# Patient Record
Sex: MALE | Race: WHITE | NOT HISPANIC OR LATINO | URBAN - METROPOLITAN AREA
[De-identification: names, ages, dates, MRNs, and addresses within clinical notes are randomized per-mention and may not be internally consistent; named-entity substitution may affect disease eponyms.]

---

## 2020-06-01 ENCOUNTER — LAB OUTSIDE AN ENCOUNTER (OUTPATIENT)
Dept: URBAN - METROPOLITAN AREA CLINIC 98 | Facility: CLINIC | Age: 55
End: 2020-06-01

## 2020-06-01 ENCOUNTER — OFFICE VISIT (OUTPATIENT)
Dept: URBAN - METROPOLITAN AREA CLINIC 98 | Facility: CLINIC | Age: 55
End: 2020-06-01

## 2020-06-01 ENCOUNTER — OFFICE VISIT (OUTPATIENT)
Dept: URBAN - METROPOLITAN AREA CLINIC 98 | Facility: CLINIC | Age: 55
End: 2020-06-01
Payer: COMMERCIAL

## 2020-06-01 DIAGNOSIS — R19.7 DIARRHEA: ICD-10-CM

## 2020-06-01 DIAGNOSIS — K92.1 RECTAL BLEEDING: ICD-10-CM

## 2020-06-01 DIAGNOSIS — K50.80 CROHN'S DISEASE OF BOTH SMALL AND LARGE INTESTINE: ICD-10-CM

## 2020-06-01 PROCEDURE — 99215 OFFICE O/P EST HI 40 MIN: CPT | Performed by: INTERNAL MEDICINE

## 2020-06-01 PROCEDURE — 99214 OFFICE O/P EST MOD 30 MIN: CPT | Performed by: INTERNAL MEDICINE

## 2020-06-01 RX ORDER — DICYCLOMINE HYDROCHLORIDE 10 MG/1
TAKE 1 CAPSULE BY ORAL ROUTE 3 TIMES A DAY AS NEEDED CAPSULE ORAL
Qty: 90 | Refills: 5 | Status: ACTIVE | COMMUNITY
Start: 2020-01-06

## 2020-06-01 RX ORDER — INFLIXIMAB 100 MG/10ML
INFUSE 400MG NO LESS THAN 2 HOUR(S) BY INTRAVENOUS ROUTE GIVE TODAY, REPEAT IN 4 WEEKS, THEN GIVE EVERY 8 WEEKS INJECTION, POWDER, LYOPHILIZED, FOR SOLUTION INTRAVENOUS
Qty: 1 | Refills: 0 | Status: ACTIVE | COMMUNITY
Start: 2019-11-25

## 2020-06-01 RX ORDER — AMLODIPINE BESYLATE 10 MG/1
TAKE 1 TABLET (10 MG) BY ORAL ROUTE ONCE DAILY TABLET ORAL 1
Qty: 0 | Refills: 0 | Status: ACTIVE | COMMUNITY
Start: 1900-01-01

## 2020-06-01 RX ORDER — PREDNISONE 10 MG/1
TAKE 1 TABLET (10 MG) BY ORAL ROUTE 4 TIMES PER DAY TABLET ORAL
Qty: 120 | Refills: 0 | Status: ACTIVE | COMMUNITY
Start: 1900-01-01

## 2020-06-01 RX ORDER — CHOLECALCIFEROL (VITAMIN D3) 50 MCG
1 TABLET TABLET ORAL ONCE A DAY
Status: ACTIVE | COMMUNITY

## 2020-06-01 NOTE — PHYSICAL EXAM MUSCULOSKELETAL:
normal gait and station , no tenderness or deformities present , normal gait and station , no tenderness or deformities present

## 2020-06-01 NOTE — PHYSICAL EXAM GASTROINTESTINAL
Abdomen , soft, nontender, nondistended , no guarding or rigidity , no masses palpable , normal bowel sounds , Liver and Spleen , no hepatomegaly present , no hepatosplenomegaly , liver nontender , spleen not palpable , Abdomen , soft, nontender, nondistended , no guarding or rigidity , no masses palpable , normal bowel sounds , Liver and Spleen , no hepatomegaly present , no hepatosplenomegaly , liver nontender , spleen not palpable

## 2020-06-01 NOTE — PHYSICAL EXAM SKIN:
no rashes , no suspicious lesions , no areas of discoloration , no jaundice present , good turgor , no masses , no tenderness on palpation , no rashes , no suspicious lesions , no areas of discoloration , no jaundice present , good turgor , no masses , no tenderness on palpation

## 2020-06-01 NOTE — PHYSICAL EXAM HENT:
Head, normocephalic, atraumatic, Face, Face within normal limits, Ears, External ears within normal limits, Nose/Nasopharynx, External nose  normal appearance, nares patent, no nasal discharge, Mouth and Throat, Oral cavity appearance normal, Breath odor normal, Lips, Appearance normal , Head, normocephalic, atraumatic, Face, Face within normal limits, Ears, External ears within normal limits, Nose/Nasopharynx, External nose  normal appearance, nares patent, no nasal discharge, Mouth and Throat, Oral cavity appearance normal, Breath odor normal, Lips, Appearance normal

## 2020-06-01 NOTE — PHYSICAL EXAM NECK/THYROID:
normal appearance , without tenderness upon palpation , no deformities , trachea midline , Thyroid normal size , no thyroid nodules , no masses , no JVD , thyroid nontender , normal appearance , without tenderness upon palpation , no deformities , trachea midline , Thyroid normal size , no thyroid nodules , no masses , no JVD , thyroid nontender

## 2020-06-01 NOTE — HPI-OTHER HISTORIES
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   Had initial visit with me on 3/4/20 and had started Remicade November 2019, and on was on 5 mg/kg q 8 weeks and I suspected he was underdosed.  Has not needed blood transfusion but is seeing Dr. Brower and getting Injectafer.  Has received 3 10 mg/kg doses at 4 week intervals.  Has 8-10 stools a day.    Has abdominal pain that comes and goes/half the movement lead to pain.  Has used prednisone in the past but is off it now.  Also got partial relief with Uceris foam.  Has not lost weight, though had lost 40 lb over 2 years.  In past year lost 20 lb.

## 2020-06-01 NOTE — HPI-TODAY'S VISIT:
1 week course of prednisone initally.  8 week course of prednisone from mid Nov to early Jan.  Prior course of Humira did not help much.  Currently having 8-10 stools, half are bloody.

## 2020-06-01 NOTE — PHYSICAL EXAM CHEST:
no lesions,  no deformities,  no traumatic injuries,  no significant scars are present,  chest wall non-tender,  no masses present,  tactile fremitus is normal, breathing is unlabored without accessory muscle use,normal breath sounds , no lesions,  no deformities,  no traumatic injuries,  no significant scars are present,  chest wall non-tender,  no masses present,  tactile fremitus is normal, breathing is unlabored without accessory muscle use,normal breath sounds

## 2020-06-05 ENCOUNTER — OFFICE VISIT (OUTPATIENT)
Dept: URBAN - METROPOLITAN AREA CLINIC 70 | Facility: CLINIC | Age: 55
End: 2020-06-05

## 2020-06-07 LAB
A/G RATIO: 1.3
ALBUMIN: 4.8
ALKALINE PHOSPHATASE: 62
ALT (SGPT): 8
ANTI-INFLIXIMAB ANTIBODY: 272
AST (SGOT): 11
BASO (ABSOLUTE): 0.1
BASOS: 1
BILIRUBIN, TOTAL: 0.3
BUN/CREATININE RATIO: 7
BUN: 8
C-REACTIVE PROTEIN, QUANT: 5
CALCIUM: 10.1
CARBON DIOXIDE, TOTAL: 21
CHLORIDE: 101
CREATININE: 1.12
EGFR IF AFRICN AM: 84
EGFR IF NONAFRICN AM: 73
EOS (ABSOLUTE): 0.3
EOS: 3
GLOBULIN, TOTAL: 3.6
GLUCOSE: 90
HEMATOCRIT: 45.5
HEMATOLOGY COMMENTS:: (no result)
HEMOGLOBIN: 14.9
IMMATURE CELLS: (no result)
IMMATURE GRANS (ABS): 0
IMMATURE GRANULOCYTES: 0
INFLIXIMAB DRUG LEVEL: 25
LYMPHS (ABSOLUTE): 1.6
LYMPHS: 21
MCH: 26.7
MCHC: 32.7
MCV: 82
MONOCYTES(ABSOLUTE): 0.7
MONOCYTES: 9
NEUTROPHILS (ABSOLUTE): 4.7
NEUTROPHILS: 66
NRBC: (no result)
PLATELETS: 379
POTASSIUM: 3.9
PROTEIN, TOTAL: 8.4
RBC: 5.58
RDW: 14.2
SEDIMENTATION RATE-WESTERGREN: 84
SODIUM: 140
WBC: 7.3

## 2020-06-17 ENCOUNTER — TELEPHONE ENCOUNTER (OUTPATIENT)
Dept: URBAN - METROPOLITAN AREA CLINIC 92 | Facility: CLINIC | Age: 55
End: 2020-06-17

## 2020-07-01 LAB
A/G RATIO: 1.3
ALBUMIN: 4.5
ALKALINE PHOSPHATASE: 54
ALT (SGPT): 10
ANTI-INFLIXIMAB ANTIBODY: 234
AST (SGOT): 10
BASO (ABSOLUTE): 0
BASOS: 1
BILIRUBIN, TOTAL: 0.2
BUN/CREATININE RATIO: 7
BUN: 8
C-REACTIVE PROTEIN, QUANT: 8
CALCIUM: 9.7
CARBON DIOXIDE, TOTAL: 23
CHLORIDE: 101
CREATININE: 1.16
EGFR IF AFRICN AM: 80
EGFR IF NONAFRICN AM: 70
EOS (ABSOLUTE): 0.3
EOS: 5
GLOBULIN, TOTAL: 3.4
GLUCOSE: 170
HEMATOCRIT: 41
HEMATOLOGY COMMENTS:: (no result)
HEMOGLOBIN: 13.9
IMMATURE CELLS: (no result)
IMMATURE GRANS (ABS): 0
IMMATURE GRANULOCYTES: 0
INFLIXIMAB DRUG LEVEL: 0.8
INTERPRETATION:: (no result)
LYMPHS (ABSOLUTE): 1.2
LYMPHS: 23
Lab: (no result)
MCH: 27.4
MCHC: 33.9
MCV: 81
MONOCYTES(ABSOLUTE): 0.4
MONOCYTES: 8
NEUTROPHILS (ABSOLUTE): 3.2
NEUTROPHILS: 63
NRBC: (no result)
PLATELETS: 326
POTASSIUM: 3.4
PROTEIN, TOTAL: 7.9
RBC: 5.07
RDW: 13.9
SEDIMENTATION RATE-WESTERGREN: 30
SODIUM: 142
TPMT ACTIVITY: 25.5
WBC: 5.1

## 2020-07-07 ENCOUNTER — TELEPHONE ENCOUNTER (OUTPATIENT)
Dept: URBAN - METROPOLITAN AREA CLINIC 92 | Facility: CLINIC | Age: 55
End: 2020-07-07

## 2020-07-07 RX ORDER — AMLODIPINE BESYLATE 10 MG/1
TAKE 1 TABLET (10 MG) BY ORAL ROUTE ONCE DAILY TABLET ORAL 1
Qty: 0 | Refills: 0 | Status: ACTIVE | COMMUNITY
Start: 1900-01-01

## 2020-07-07 RX ORDER — AZATHIOPRINE 50 MG/1
ONE TABLET ORAL
Qty: 30 | Refills: 3 | OUTPATIENT
Start: 2020-07-07 | End: 2020-11-03

## 2020-07-07 RX ORDER — CHOLECALCIFEROL (VITAMIN D3) 50 MCG
1 TABLET TABLET ORAL ONCE A DAY
Status: ACTIVE | COMMUNITY

## 2020-07-07 RX ORDER — INFLIXIMAB 100 MG/10ML
INFUSE 400MG NO LESS THAN 2 HOUR(S) BY INTRAVENOUS ROUTE GIVE TODAY, REPEAT IN 4 WEEKS, THEN GIVE EVERY 8 WEEKS INJECTION, POWDER, LYOPHILIZED, FOR SOLUTION INTRAVENOUS
Qty: 1 | Refills: 0 | Status: ACTIVE | COMMUNITY
Start: 2019-11-25

## 2020-07-07 RX ORDER — DICYCLOMINE HYDROCHLORIDE 10 MG/1
TAKE 1 CAPSULE BY ORAL ROUTE 3 TIMES A DAY AS NEEDED CAPSULE ORAL
Qty: 90 | Refills: 5 | Status: ACTIVE | COMMUNITY
Start: 2020-01-06

## 2020-07-07 RX ORDER — PREDNISONE 10 MG/1
TAKE 1 TABLET (10 MG) BY ORAL ROUTE 4 TIMES PER DAY TABLET ORAL
Qty: 120 | Refills: 0 | Status: ACTIVE | COMMUNITY
Start: 1900-01-01

## 2020-07-24 ENCOUNTER — TELEPHONE ENCOUNTER (OUTPATIENT)
Dept: URBAN - METROPOLITAN AREA CLINIC 92 | Facility: CLINIC | Age: 55
End: 2020-07-24

## 2020-07-24 ENCOUNTER — OFFICE VISIT (OUTPATIENT)
Dept: URBAN - METROPOLITAN AREA TELEHEALTH 2 | Facility: TELEHEALTH | Age: 55
End: 2020-07-24
Payer: COMMERCIAL

## 2020-07-24 DIAGNOSIS — K76.0 FATTY LIVER: ICD-10-CM

## 2020-07-24 DIAGNOSIS — R19.7 DIARRHEA: ICD-10-CM

## 2020-07-24 DIAGNOSIS — Z79.899 IMMUNOSUPPRESSION DUE TO DRUG THERAPY: ICD-10-CM

## 2020-07-24 DIAGNOSIS — K51.80 OTHER ULCERATIVE COLITIS WITHOUT COMPLICATIONS: ICD-10-CM

## 2020-07-24 DIAGNOSIS — K92.1 BLOOD IN STOOL: ICD-10-CM

## 2020-07-24 DIAGNOSIS — R10.84 ABDOMINAL PAIN, GENERALIZED: ICD-10-CM

## 2020-07-24 PROCEDURE — 82397 CHEMILUMINESCENT ASSAY: CPT | Performed by: INTERNAL MEDICINE

## 2020-07-24 PROCEDURE — 99214 OFFICE O/P EST MOD 30 MIN: CPT | Performed by: INTERNAL MEDICINE

## 2020-07-24 RX ORDER — PREDNISONE 10 MG/1
TAKE 1 TABLET (10 MG) BY ORAL ROUTE 4 TIMES PER DAY TABLET ORAL
Qty: 120 | Refills: 0 | COMMUNITY
Start: 1900-01-01

## 2020-07-24 RX ORDER — AZATHIOPRINE 50 MG/1
ONE TABLET ORAL
Qty: 30 | Refills: 3 | COMMUNITY
Start: 2020-07-07 | End: 2020-11-03

## 2020-07-24 RX ORDER — AMLODIPINE BESYLATE 10 MG/1
TAKE 1 TABLET (10 MG) BY ORAL ROUTE ONCE DAILY TABLET ORAL 1
Qty: 0 | Refills: 0 | COMMUNITY
Start: 1900-01-01

## 2020-07-24 RX ORDER — CHOLECALCIFEROL (VITAMIN D3) 50 MCG
1 TABLET TABLET ORAL ONCE A DAY
COMMUNITY

## 2020-07-24 RX ORDER — INFLIXIMAB 100 MG/10ML
INFUSE 400MG NO LESS THAN 2 HOUR(S) BY INTRAVENOUS ROUTE GIVE TODAY, REPEAT IN 4 WEEKS, THEN GIVE EVERY 8 WEEKS INJECTION, POWDER, LYOPHILIZED, FOR SOLUTION INTRAVENOUS
Qty: 1 | Refills: 0 | COMMUNITY
Start: 2019-11-25

## 2020-07-24 RX ORDER — DICYCLOMINE HYDROCHLORIDE 10 MG/1
TAKE 1 CAPSULE BY ORAL ROUTE 3 TIMES A DAY AS NEEDED CAPSULE ORAL
Qty: 90 | Refills: 5 | COMMUNITY
Start: 2020-01-06

## 2020-07-24 NOTE — HPI-OTHER HISTORIES
56 yo  male with Crohn's disease. Much the same. Negligible  Stool a little more solid.  8 stools a day May be less blood q 4 rather than q 3 days. Gets cramping. Pain is the same---same as cramping.  Remicade 10 mg/kg q 4 weeks. ---- level is low--.8 --- way too  Azathioprine 50 mg a day.   - abd --- Quirky ---- 50 % better and 5-10% worse  Will check labs in August and go slow dose increase until then  Went from 240 to 200 with dieting and CD has caused weight to drop from 200 to 175. Was as low as 164.   no h/o diabetes.  Will increase aza to 2 per day and get labs in 1 week. 7/30  Get pre-IFX dose Bebo and aza level in August.

## 2020-08-17 ENCOUNTER — LAB OUTSIDE AN ENCOUNTER (OUTPATIENT)
Dept: URBAN - METROPOLITAN AREA CLINIC 86 | Facility: CLINIC | Age: 55
End: 2020-08-17

## 2020-08-27 LAB
6-MMPN: 1558
6-TGN: 236
A/G RATIO: 1.3
ALBUMIN: 4.7
ALKALINE PHOSPHATASE: 53
ALT (SGPT): 8
AMYLASE: 95
ANTI-INFLIXIMAB ANTIBODY: 347
AST (SGOT): 12
BASO (ABSOLUTE): 0.1
BASOS: 1
BILIRUBIN, TOTAL: 0.4
BUN/CREATININE RATIO: 7
BUN: 8
C-REACTIVE PROTEIN, QUANT: 5
CALCIUM: 9.6
CARBON DIOXIDE, TOTAL: 23
CHLORIDE: 102
CREATININE: 1.13
EGFR IF AFRICN AM: 83
EGFR IF NONAFRICN AM: 72
EOS (ABSOLUTE): 0.2
EOS: 3
GLOBULIN, TOTAL: 3.5
GLUCOSE: 98
HEMATOCRIT: 45.8
HEMATOLOGY COMMENTS:: (no result)
HEMOGLOBIN: 14.3
IMMATURE CELLS: (no result)
IMMATURE GRANS (ABS): 0
IMMATURE GRANULOCYTES: 0
INFLIXIMAB DRUG LEVEL: 5.2
LIPASE: 38
LYMPHS (ABSOLUTE): 1.2
LYMPHS: 22
MCH: 25.3
MCHC: 31.2
MCV: 81
MONOCYTES(ABSOLUTE): 0.5
MONOCYTES: 9
NEUTROPHILS (ABSOLUTE): 3.5
NEUTROPHILS: 65
NRBC: (no result)
PLATELETS: 346
POTASSIUM: 3.6
PROTEIN, TOTAL: 8.2
RBC: 5.65
RDW: 14
SEDIMENTATION RATE-WESTERGREN: 24
SODIUM: 142
WBC: 5.4

## 2020-09-02 ENCOUNTER — TELEPHONE ENCOUNTER (OUTPATIENT)
Dept: URBAN - METROPOLITAN AREA CLINIC 92 | Facility: CLINIC | Age: 55
End: 2020-09-02

## 2020-09-02 RX ORDER — INFLIXIMAB 100 MG/10ML
INFUSE 400MG NO LESS THAN 2 HOUR(S) BY INTRAVENOUS ROUTE GIVE TODAY, REPEAT IN 4 WEEKS, THEN GIVE EVERY 8 WEEKS INJECTION, POWDER, LYOPHILIZED, FOR SOLUTION INTRAVENOUS
Qty: 1 | Refills: 0
Start: 2019-11-25

## 2020-09-04 ENCOUNTER — TELEPHONE ENCOUNTER (OUTPATIENT)
Dept: URBAN - METROPOLITAN AREA CLINIC 70 | Facility: CLINIC | Age: 55
End: 2020-09-04

## 2020-09-04 ENCOUNTER — LAB OUTSIDE AN ENCOUNTER (OUTPATIENT)
Dept: URBAN - METROPOLITAN AREA CLINIC 70 | Facility: CLINIC | Age: 55
End: 2020-09-04

## 2020-10-19 ENCOUNTER — TELEPHONE ENCOUNTER (OUTPATIENT)
Dept: URBAN - METROPOLITAN AREA CLINIC 98 | Facility: CLINIC | Age: 55
End: 2020-10-19

## 2020-10-19 RX ORDER — AZATHIOPRINE 50 MG/1
TAKE 50 MG TABLET ORAL ONCE DAILY
Qty: 90 TABLETS | Refills: 3 | OUTPATIENT
Start: 2020-10-19 | End: 2021-10-14

## 2020-12-02 ENCOUNTER — TELEPHONE ENCOUNTER (OUTPATIENT)
Dept: URBAN - METROPOLITAN AREA CLINIC 70 | Facility: CLINIC | Age: 55
End: 2020-12-02

## 2020-12-02 ENCOUNTER — LAB OUTSIDE AN ENCOUNTER (OUTPATIENT)
Dept: URBAN - METROPOLITAN AREA CLINIC 70 | Facility: CLINIC | Age: 55
End: 2020-12-02

## 2020-12-08 ENCOUNTER — TELEPHONE ENCOUNTER (OUTPATIENT)
Dept: URBAN - METROPOLITAN AREA CLINIC 98 | Facility: CLINIC | Age: 55
End: 2020-12-08

## 2020-12-08 RX ORDER — AZATHIOPRINE 50 MG/1
TAKE ONE TABLET TABLET ORAL BID
Qty: 180 TABLETS | Refills: 3 | OUTPATIENT
Start: 2020-12-08 | End: 2021-12-03

## 2021-03-10 ENCOUNTER — TELEPHONE ENCOUNTER (OUTPATIENT)
Dept: URBAN - METROPOLITAN AREA CLINIC 70 | Facility: CLINIC | Age: 56
End: 2021-03-10

## 2021-03-12 ENCOUNTER — TELEPHONE ENCOUNTER (OUTPATIENT)
Dept: URBAN - METROPOLITAN AREA CLINIC 70 | Facility: CLINIC | Age: 56
End: 2021-03-12

## 2021-05-21 ENCOUNTER — TELEPHONE ENCOUNTER (OUTPATIENT)
Dept: URBAN - METROPOLITAN AREA CLINIC 70 | Facility: CLINIC | Age: 56
End: 2021-05-21

## 2021-06-08 ENCOUNTER — TELEPHONE ENCOUNTER (OUTPATIENT)
Dept: URBAN - METROPOLITAN AREA CLINIC 92 | Facility: CLINIC | Age: 56
End: 2021-06-08

## 2021-06-08 ENCOUNTER — TELEPHONE ENCOUNTER (OUTPATIENT)
Dept: URBAN - METROPOLITAN AREA CLINIC 98 | Facility: CLINIC | Age: 56
End: 2021-06-08

## 2021-06-08 RX ORDER — INFLIXIMAB 100 MG/10ML
INFUSE 400MG NO LESS THAN 2 HOUR(S) BY INTRAVENOUS ROUTE GIVE TODAY, REPEAT IN 4 WEEKS, THEN GIVE EVERY 8 WEEKS INJECTION, POWDER, LYOPHILIZED, FOR SOLUTION INTRAVENOUS
Qty: 1 | Refills: 0
Start: 2019-11-25

## 2021-06-08 RX ORDER — INFLIXIMAB-DYYB 100 MG/10ML
INFUSE 10MG/KG INJECTION, POWDER, LYOPHILIZED, FOR SOLUTION INTRAVENOUS
Qty: 1 VIAL | Refills: 11 | OUTPATIENT
Start: 2021-06-08 | End: 2022-06-03

## 2021-06-09 ENCOUNTER — TELEPHONE ENCOUNTER (OUTPATIENT)
Dept: URBAN - METROPOLITAN AREA CLINIC 92 | Facility: CLINIC | Age: 56
End: 2021-06-09

## 2021-06-09 ENCOUNTER — LAB OUTSIDE AN ENCOUNTER (OUTPATIENT)
Dept: URBAN - METROPOLITAN AREA CLINIC 98 | Facility: CLINIC | Age: 56
End: 2021-06-09

## 2021-06-09 ENCOUNTER — OFFICE VISIT (OUTPATIENT)
Dept: URBAN - METROPOLITAN AREA CLINIC 98 | Facility: CLINIC | Age: 56
End: 2021-06-09
Payer: COMMERCIAL

## 2021-06-09 DIAGNOSIS — R19.7 DIARRHEA: ICD-10-CM

## 2021-06-09 DIAGNOSIS — Z79.899 IMMUNOSUPPRESSION DUE TO DRUG THERAPY: ICD-10-CM

## 2021-06-09 DIAGNOSIS — K92.1 BLOOD IN STOOL: ICD-10-CM

## 2021-06-09 DIAGNOSIS — K50.80 CROHN'S COLITIS: ICD-10-CM

## 2021-06-09 PROCEDURE — 99215 OFFICE O/P EST HI 40 MIN: CPT | Performed by: INTERNAL MEDICINE

## 2021-06-09 RX ORDER — AZATHIOPRINE 50 MG/1
TAKE ONE TABLET TABLET ORAL BID
Qty: 180 TABLETS | Refills: 3 | COMMUNITY
Start: 2020-12-08 | End: 2021-12-03

## 2021-06-09 RX ORDER — INFLIXIMAB 100 MG/10ML
INFUSE 400MG NO LESS THAN 2 HOUR(S) BY INTRAVENOUS ROUTE GIVE TODAY, REPEAT IN 4 WEEKS, THEN GIVE EVERY 8 WEEKS INJECTION, POWDER, LYOPHILIZED, FOR SOLUTION INTRAVENOUS
Qty: 1 | Refills: 0 | COMMUNITY
Start: 2019-11-25

## 2021-06-09 RX ORDER — PREDNISONE 10 MG/1
TAKE 1 TABLET (10 MG) BY ORAL ROUTE 4 TIMES PER DAY TABLET ORAL
Qty: 120 | Refills: 0 | COMMUNITY
Start: 1900-01-01

## 2021-06-09 RX ORDER — AZATHIOPRINE 50 MG/1
TAKE 50 MG TABLET ORAL ONCE DAILY
Qty: 90 TABLETS | Refills: 3 | COMMUNITY
Start: 2020-10-19 | End: 2021-10-14

## 2021-06-09 RX ORDER — AMLODIPINE BESYLATE 10 MG/1
TAKE 1 TABLET (10 MG) BY ORAL ROUTE ONCE DAILY TABLET ORAL 1
Qty: 0 | Refills: 0 | COMMUNITY
Start: 1900-01-01

## 2021-06-09 RX ORDER — CHOLECALCIFEROL (VITAMIN D3) 50 MCG
1 TABLET TABLET ORAL ONCE A DAY
COMMUNITY

## 2021-06-09 RX ORDER — INFLIXIMAB-DYYB 100 MG/10ML
INFUSE 10MG/KG INJECTION, POWDER, LYOPHILIZED, FOR SOLUTION INTRAVENOUS
Qty: 1 VIAL | Refills: 6 | OUTPATIENT
Start: 2021-06-09 | End: 2022-01-04

## 2021-06-09 RX ORDER — DICYCLOMINE HYDROCHLORIDE 10 MG/1
TAKE 1 CAPSULE BY ORAL ROUTE 3 TIMES A DAY AS NEEDED CAPSULE ORAL
Qty: 90 | Refills: 5 | COMMUNITY
Start: 2020-01-06

## 2021-06-09 RX ORDER — INFLIXIMAB 100 MG/10ML
10 MG/KG AS DIRECTED INJECTION, POWDER, LYOPHILIZED, FOR SOLUTION INTRAVENOUS
Qty: 1 BAG | Refills: 6 | OUTPATIENT
Start: 2021-06-09 | End: 2021-12-21

## 2021-06-09 NOTE — HPI-TODAY'S VISIT:
57 yo male took early nursing home from [employer] and comes in for 1 year f/u.  Has improved on Remicade 10 mg/kg q 4 weeks.  Stooling has improved.  He is 2 weeks overdue for Remicade.  He was getting infusions at [facility] in [place].  Insurance denied and reapplied but past due.  Stooling has improved from 8 to 4 per day, more formed and blood couple times a month.  Moderate bleeding on [place].  Taking 100 mg azathioprine per day.  Last prednisone 1 year ago  Needs to change infusion site as they could not an approval - [facility] in [place].  IFX level 5.2   /ROGERS - 347 Active GI bleed 2019  Needed iv iron from hematologist in April 2021--- Dr. Brian Choi --------     ===================== 7/24/20  56 yo  male with Crohn's disease. Much the same. Negligible  Stool a little more solid.  8 stools a day May be less blood q 4 rather than q 3 days. Gets cramping. Pain is the same---same as cramping.  Remicade 10 mg/kg q 4 weeks. ---- level is low--.8 --- way too  Azathioprine 50 mg a day.   - abd --- Quirky ---- 50 % better and 5-10% worse  Will check labs in August and go slow dose increase until then  Went from 240 to 200 with dieting and CD has caused weight to drop from 200 to 175. Was as low as 164.   no h/o diabetes.  Will increase aza to 2 per day and get labs in 1 week. 7/30  Get pre-IFX dose Bebo and aza level in August.

## 2021-06-16 LAB
A/G RATIO: 1.2
ALBUMIN: 4.7
ALKALINE PHOSPHATASE: 59
ALT (SGPT): 11
ANTI-INFLIXIMAB ANTIBODY: 50
AST (SGOT): 11
BASO (ABSOLUTE): 0
BASOS: 1
BILIRUBIN, TOTAL: 0.4
BUN/CREATININE RATIO: 10
BUN: 11
C-REACTIVE PROTEIN, QUANT: 9
CALCIUM: 9.5
CARBON DIOXIDE, TOTAL: 25
CHLORIDE: 101
CREATININE: 1.06
EGFR IF AFRICN AM: 89
EGFR IF NONAFRICN AM: 77
EOS (ABSOLUTE): 0.1
EOS: 2
FERRITIN, SERUM: 107
FOLATE (FOLIC ACID), SERUM: 11.6
GLOBULIN, TOTAL: 3.8
GLUCOSE: 109
HBSAG SCREEN: NEGATIVE
HEMATOCRIT: 50
HEMATOLOGY COMMENTS:: (no result)
HEMOGLOBIN: 16
HEP B CORE AB, IGM: NEGATIVE
HEP B CORE AB, TOT: NEGATIVE
HEP B SURFACE AB, QUAL: NON REACTIVE
HEP BE AB: NEGATIVE
HEP BE AG: NEGATIVE
IMMATURE CELLS: (no result)
IMMATURE GRANS (ABS): 0
IMMATURE GRANULOCYTES: 0
INFLIXIMAB DRUG LEVEL: 4.9
IRON BIND.CAP.(TIBC): 367
IRON SATURATION: 14
IRON: 51
LYMPHS (ABSOLUTE): 1.1
LYMPHS: 19
MCH: 25.8
MCHC: 32
MCV: 81
MONOCYTES(ABSOLUTE): 0.5
MONOCYTES: 9
NEUTROPHILS (ABSOLUTE): 3.9
NEUTROPHILS: 69
NRBC: (no result)
PLATELETS: 334
POTASSIUM: 3.5
PROTEIN, TOTAL: 8.5
QUANTIFERON CRITERIA: (no result)
QUANTIFERON INCUBATION: (no result)
QUANTIFERON MITOGEN VALUE: >10
QUANTIFERON NIL VALUE: 0
QUANTIFERON TB1 AG VALUE: 0.04
QUANTIFERON TB2 AG VALUE: 0.02
QUANTIFERON-TB GOLD PLUS: NEGATIVE
RBC: 6.2
RDW: 14.8
SEDIMENTATION RATE-WESTERGREN: 54
SODIUM: 138
UIBC: 316
VITAMIN B12: 300
VITAMIN C: 0.4
VITAMIN D, 25-HYDROXY: 36.8
WBC: 5.6

## 2021-06-17 LAB
C DIFFICILE TOXIN GENE NAA: NEGATIVE
C DIFFICILE TOXINS A+B, EIA: NEGATIVE
CAMPYLOBACTER CULTURE: (no result)
E COLI SHIGA TOXIN EIA: (no result)
LACTOFERRIN, FECAL, QUANT.: 5.58
OVA + PARASITE EXAM: (no result)
REQUEST PROBLEM: (no result)
SALMONELLA/SHIGELLA SCREEN: (no result)

## 2021-06-23 ENCOUNTER — LAB OUTSIDE AN ENCOUNTER (OUTPATIENT)
Dept: URBAN - METROPOLITAN AREA CLINIC 98 | Facility: CLINIC | Age: 56
End: 2021-06-23

## 2021-07-01 ENCOUNTER — CLAIMS CREATED FROM THE CLAIM WINDOW (OUTPATIENT)
Dept: URBAN - METROPOLITAN AREA CLINIC 4 | Facility: CLINIC | Age: 56
End: 2021-07-01
Payer: COMMERCIAL

## 2021-07-01 ENCOUNTER — OFFICE VISIT (OUTPATIENT)
Dept: URBAN - METROPOLITAN AREA SURGERY CENTER 18 | Facility: SURGERY CENTER | Age: 56
End: 2021-07-01
Payer: COMMERCIAL

## 2021-07-01 ENCOUNTER — TELEPHONE ENCOUNTER (OUTPATIENT)
Dept: URBAN - METROPOLITAN AREA CLINIC 98 | Facility: CLINIC | Age: 56
End: 2021-07-01

## 2021-07-01 DIAGNOSIS — R12 BURNING REFLUX: ICD-10-CM

## 2021-07-01 DIAGNOSIS — K31.89 GASTRIC FOVEOLAR HYPERPLASIA: ICD-10-CM

## 2021-07-01 DIAGNOSIS — K31.89 ACQUIRED DEFORMITY OF DUODENUM: ICD-10-CM

## 2021-07-01 DIAGNOSIS — K51.80 CHRONIC PANCOLONIC ULCERATIVE COLITIS: ICD-10-CM

## 2021-07-01 DIAGNOSIS — K50.10 CC (CROHN'S COLITIS): ICD-10-CM

## 2021-07-01 DIAGNOSIS — K63.89 POLYP OF ILEUM: ICD-10-CM

## 2021-07-01 DIAGNOSIS — K22.8 COLUMNAR-LINED ESOPHAGUS: ICD-10-CM

## 2021-07-01 DIAGNOSIS — K22.10 ULCER OF ESOPHAGUS WITHOUT BLEEDING: ICD-10-CM

## 2021-07-01 DIAGNOSIS — K63.89 BACTERIAL OVERGROWTH SYNDROME: ICD-10-CM

## 2021-07-01 DIAGNOSIS — K51.80 OTHER ULCERATIVE COLITIS WITHOUT COMPLICATIONS: ICD-10-CM

## 2021-07-01 DIAGNOSIS — K20.80 ESOPHAGITIS DISSECANS SUPERFICIALIS: ICD-10-CM

## 2021-07-01 PROCEDURE — 88342 IMHCHEM/IMCYTCHM 1ST ANTB: CPT | Performed by: PATHOLOGY

## 2021-07-01 PROCEDURE — 88305 TISSUE EXAM BY PATHOLOGIST: CPT | Performed by: PATHOLOGY

## 2021-07-01 PROCEDURE — 43239 EGD BIOPSY SINGLE/MULTIPLE: CPT | Performed by: INTERNAL MEDICINE

## 2021-07-01 PROCEDURE — 88341 IMHCHEM/IMCYTCHM EA ADD ANTB: CPT | Performed by: PATHOLOGY

## 2021-07-01 PROCEDURE — 88312 SPECIAL STAINS GROUP 1: CPT | Performed by: PATHOLOGY

## 2021-07-01 PROCEDURE — 45380 COLONOSCOPY AND BIOPSY: CPT | Performed by: INTERNAL MEDICINE

## 2021-07-01 PROCEDURE — G8907 PT DOC NO EVENTS ON DISCHARG: HCPCS | Performed by: INTERNAL MEDICINE

## 2021-07-01 RX ORDER — AZATHIOPRINE 50 MG/1
TAKE ONE TABLET TABLET ORAL BID
Qty: 180 TABLETS | Refills: 3 | COMMUNITY
Start: 2020-12-08 | End: 2021-12-03

## 2021-07-01 RX ORDER — INFLIXIMAB 100 MG/10ML
INFUSE 400MG NO LESS THAN 2 HOUR(S) BY INTRAVENOUS ROUTE GIVE TODAY, REPEAT IN 4 WEEKS, THEN GIVE EVERY 8 WEEKS INJECTION, POWDER, LYOPHILIZED, FOR SOLUTION INTRAVENOUS
Qty: 1 | Refills: 0 | COMMUNITY
Start: 2019-11-25

## 2021-07-01 RX ORDER — CHOLECALCIFEROL (VITAMIN D3) 50 MCG
1 TABLET TABLET ORAL ONCE A DAY
COMMUNITY

## 2021-07-01 RX ORDER — INFLIXIMAB 100 MG/10ML
10 MG/KG AS DIRECTED INJECTION, POWDER, LYOPHILIZED, FOR SOLUTION INTRAVENOUS
Qty: 1 BAG | Refills: 6 | Status: ACTIVE | COMMUNITY
Start: 2021-06-09 | End: 2021-12-21

## 2021-07-01 RX ORDER — AMLODIPINE BESYLATE 10 MG/1
TAKE 1 TABLET (10 MG) BY ORAL ROUTE ONCE DAILY TABLET ORAL 1
Qty: 0 | Refills: 0 | COMMUNITY
Start: 1900-01-01

## 2021-07-01 RX ORDER — OMEPRAZOLE 40 MG/1
TAKE ONE CAPSULE CAPSULE, DELAYED RELEASE ORAL TWICE DAILY
Qty: 180 CAPSULES | Refills: 3 | OUTPATIENT
Start: 2021-07-01

## 2021-07-01 RX ORDER — AZATHIOPRINE 50 MG/1
TAKE 50 MG TABLET ORAL ONCE DAILY
Qty: 90 TABLETS | Refills: 3 | COMMUNITY
Start: 2020-10-19 | End: 2021-10-14

## 2021-07-01 RX ORDER — DICYCLOMINE HYDROCHLORIDE 10 MG/1
TAKE 1 CAPSULE BY ORAL ROUTE 3 TIMES A DAY AS NEEDED CAPSULE ORAL
Qty: 90 | Refills: 5 | COMMUNITY
Start: 2020-01-06

## 2021-07-01 RX ORDER — PREDNISONE 10 MG/1
TAKE 1 TABLET (10 MG) BY ORAL ROUTE 4 TIMES PER DAY TABLET ORAL
Qty: 120 | Refills: 0 | COMMUNITY
Start: 1900-01-01

## 2021-07-01 RX ORDER — INFLIXIMAB-DYYB 100 MG/10ML
INFUSE 10MG/KG INJECTION, POWDER, LYOPHILIZED, FOR SOLUTION INTRAVENOUS
Qty: 1 VIAL | Refills: 6 | Status: ACTIVE | COMMUNITY
Start: 2021-06-09 | End: 2022-01-04

## 2021-07-02 ENCOUNTER — OFFICE VISIT (OUTPATIENT)
Dept: URBAN - METROPOLITAN AREA CLINIC 117 | Facility: CLINIC | Age: 56
End: 2021-07-02
Payer: COMMERCIAL

## 2021-07-02 DIAGNOSIS — K50.80 CROHN'S COLITIS: ICD-10-CM

## 2021-07-02 PROCEDURE — 96413 CHEMO IV INFUSION 1 HR: CPT | Performed by: INTERNAL MEDICINE

## 2021-07-02 PROCEDURE — 96415 CHEMO IV INFUSION ADDL HR: CPT | Performed by: INTERNAL MEDICINE

## 2021-07-02 RX ORDER — CHOLECALCIFEROL (VITAMIN D3) 50 MCG
1 TABLET TABLET ORAL ONCE A DAY
COMMUNITY

## 2021-07-02 RX ORDER — INFLIXIMAB-DYYB 100 MG/10ML
INFUSE 10MG/KG INJECTION, POWDER, LYOPHILIZED, FOR SOLUTION INTRAVENOUS
Qty: 1 VIAL | Refills: 6 | Status: ACTIVE | COMMUNITY
Start: 2021-06-09 | End: 2022-01-04

## 2021-07-02 RX ORDER — AZATHIOPRINE 50 MG/1
TAKE 50 MG TABLET ORAL ONCE DAILY
Qty: 90 TABLETS | Refills: 3 | COMMUNITY
Start: 2020-10-19 | End: 2021-10-14

## 2021-07-02 RX ORDER — DICYCLOMINE HYDROCHLORIDE 10 MG/1
TAKE 1 CAPSULE BY ORAL ROUTE 3 TIMES A DAY AS NEEDED CAPSULE ORAL
Qty: 90 | Refills: 5 | COMMUNITY
Start: 2020-01-06

## 2021-07-02 RX ORDER — AZATHIOPRINE 50 MG/1
TAKE ONE TABLET TABLET ORAL BID
Qty: 180 TABLETS | Refills: 3 | COMMUNITY
Start: 2020-12-08 | End: 2021-12-03

## 2021-07-02 RX ORDER — INFLIXIMAB 100 MG/10ML
INFUSE 400MG NO LESS THAN 2 HOUR(S) BY INTRAVENOUS ROUTE GIVE TODAY, REPEAT IN 4 WEEKS, THEN GIVE EVERY 8 WEEKS INJECTION, POWDER, LYOPHILIZED, FOR SOLUTION INTRAVENOUS
Qty: 1 | Refills: 0 | COMMUNITY
Start: 2019-11-25

## 2021-07-02 RX ORDER — PREDNISONE 10 MG/1
TAKE 1 TABLET (10 MG) BY ORAL ROUTE 4 TIMES PER DAY TABLET ORAL
Qty: 120 | Refills: 0 | COMMUNITY
Start: 1900-01-01

## 2021-07-02 RX ORDER — OMEPRAZOLE 40 MG/1
TAKE ONE CAPSULE CAPSULE, DELAYED RELEASE ORAL TWICE DAILY
Qty: 180 CAPSULES | Refills: 3 | Status: ACTIVE | COMMUNITY
Start: 2021-07-01

## 2021-07-02 RX ORDER — INFLIXIMAB 100 MG/10ML
10 MG/KG AS DIRECTED INJECTION, POWDER, LYOPHILIZED, FOR SOLUTION INTRAVENOUS
Qty: 1 BAG | Refills: 6 | Status: ACTIVE | COMMUNITY
Start: 2021-06-09 | End: 2021-12-21

## 2021-07-02 RX ORDER — AMLODIPINE BESYLATE 10 MG/1
TAKE 1 TABLET (10 MG) BY ORAL ROUTE ONCE DAILY TABLET ORAL 1
Qty: 0 | Refills: 0 | COMMUNITY
Start: 1900-01-01

## 2021-07-06 ENCOUNTER — OFFICE VISIT (OUTPATIENT)
Dept: URBAN - METROPOLITAN AREA CLINIC 98 | Facility: CLINIC | Age: 56
End: 2021-07-06

## 2021-07-06 RX ORDER — INFLIXIMAB 100 MG/10ML
INFUSE 400MG NO LESS THAN 2 HOUR(S) BY INTRAVENOUS ROUTE GIVE TODAY, REPEAT IN 4 WEEKS, THEN GIVE EVERY 8 WEEKS INJECTION, POWDER, LYOPHILIZED, FOR SOLUTION INTRAVENOUS
Qty: 1 | Refills: 0 | Status: ACTIVE | COMMUNITY
Start: 2019-11-25

## 2021-07-06 RX ORDER — PREDNISONE 10 MG/1
TAKE 1 TABLET (10 MG) BY ORAL ROUTE 4 TIMES PER DAY TABLET ORAL
Qty: 120 | Refills: 0 | Status: ACTIVE | COMMUNITY
Start: 1900-01-01

## 2021-07-06 RX ORDER — CHOLECALCIFEROL (VITAMIN D3) 50 MCG
1 TABLET TABLET ORAL ONCE A DAY
Status: ACTIVE | COMMUNITY

## 2021-07-06 RX ORDER — AMLODIPINE BESYLATE 10 MG/1
TAKE 1 TABLET (10 MG) BY ORAL ROUTE ONCE DAILY TABLET ORAL 1
Qty: 0 | Refills: 0 | Status: ACTIVE | COMMUNITY
Start: 1900-01-01

## 2021-07-06 RX ORDER — AZATHIOPRINE 50 MG/1
TAKE ONE TABLET TABLET ORAL BID
Qty: 180 TABLETS | Refills: 3 | Status: DISCONTINUED | COMMUNITY
Start: 2020-12-08 | End: 2021-12-03

## 2021-07-06 RX ORDER — DICYCLOMINE HYDROCHLORIDE 10 MG/1
TAKE 1 CAPSULE BY ORAL ROUTE 3 TIMES A DAY AS NEEDED CAPSULE ORAL
Qty: 90 | Refills: 5 | Status: ACTIVE | COMMUNITY
Start: 2020-01-06

## 2021-07-06 RX ORDER — AZATHIOPRINE 50 MG/1
TAKE 50 MG TABLET ORAL ONCE DAILY
Qty: 90 TABLETS | Refills: 3 | Status: ACTIVE | COMMUNITY
Start: 2020-10-19 | End: 2021-10-14

## 2021-07-20 ENCOUNTER — TELEPHONE ENCOUNTER (OUTPATIENT)
Dept: URBAN - METROPOLITAN AREA CLINIC 98 | Facility: CLINIC | Age: 56
End: 2021-07-20

## 2021-07-20 ENCOUNTER — OFFICE VISIT (OUTPATIENT)
Dept: URBAN - METROPOLITAN AREA CLINIC 98 | Facility: CLINIC | Age: 56
End: 2021-07-20
Payer: COMMERCIAL

## 2021-07-20 ENCOUNTER — WEB ENCOUNTER (OUTPATIENT)
Dept: URBAN - METROPOLITAN AREA CLINIC 98 | Facility: CLINIC | Age: 56
End: 2021-07-20

## 2021-07-20 DIAGNOSIS — R19.7 DIARRHEA: ICD-10-CM

## 2021-07-20 DIAGNOSIS — K21.9 GASTROESOPHAGEAL REFLUX DISEASE, UNSPECIFIED WHETHER ESOPHAGITIS PRESENT: ICD-10-CM

## 2021-07-20 DIAGNOSIS — K50.10 CROHN'S COLITIS: ICD-10-CM

## 2021-07-20 DIAGNOSIS — K92.1 BLOOD IN STOOL: ICD-10-CM

## 2021-07-20 PROCEDURE — 99215 OFFICE O/P EST HI 40 MIN: CPT | Performed by: INTERNAL MEDICINE

## 2021-07-20 RX ORDER — OMEPRAZOLE 40 MG/1
TAKE ONE CAPSULE CAPSULE, DELAYED RELEASE ORAL TWICE DAILY
Qty: 180 CAPSULES | Refills: 3 | Status: ACTIVE | COMMUNITY
Start: 2021-07-01

## 2021-07-20 RX ORDER — AZATHIOPRINE 50 MG/1
TAKE 50 MG TABLET ORAL ONCE DAILY
Qty: 90 TABLETS | Refills: 3 | Status: ACTIVE | COMMUNITY
Start: 2020-10-19 | End: 2021-10-14

## 2021-07-20 RX ORDER — INFLIXIMAB 100 MG/10ML
10 MG/KG AS DIRECTED INJECTION, POWDER, LYOPHILIZED, FOR SOLUTION INTRAVENOUS
Qty: 1 BAG | Refills: 6 | Status: ACTIVE | COMMUNITY
Start: 2021-06-09 | End: 2021-12-21

## 2021-07-20 RX ORDER — CHOLECALCIFEROL (VITAMIN D3) 50 MCG
1 TABLET TABLET ORAL ONCE A DAY
Status: ACTIVE | COMMUNITY

## 2021-07-20 RX ORDER — DICYCLOMINE HYDROCHLORIDE 10 MG/1
TAKE 1 CAPSULE BY ORAL ROUTE 3 TIMES A DAY AS NEEDED CAPSULE ORAL
Qty: 90 | Refills: 5 | Status: ACTIVE | COMMUNITY
Start: 2020-01-06

## 2021-07-20 RX ORDER — PREDNISONE 10 MG/1
TAKE 1 TABLET (10 MG) BY ORAL ROUTE 4 TIMES PER DAY TABLET ORAL
Qty: 120 | Refills: 0 | Status: ACTIVE | COMMUNITY
Start: 1900-01-01

## 2021-07-20 RX ORDER — OMEPRAZOLE 40 MG/1
1 CAPSULE 30 MINUTES BEFORE MORNING MEAL CAPSULE, DELAYED RELEASE ORAL ONCE A DAY
Qty: 90 CAPSULES | Refills: 1 | OUTPATIENT
Start: 2021-07-20

## 2021-07-20 RX ORDER — INFLIXIMAB 100 MG/10ML
INFUSE 400MG NO LESS THAN 2 HOUR(S) BY INTRAVENOUS ROUTE GIVE TODAY, REPEAT IN 4 WEEKS, THEN GIVE EVERY 8 WEEKS INJECTION, POWDER, LYOPHILIZED, FOR SOLUTION INTRAVENOUS
Qty: 1 | Refills: 0 | Status: ACTIVE | COMMUNITY
Start: 2019-11-25

## 2021-07-20 RX ORDER — AMLODIPINE BESYLATE 10 MG/1
TAKE 1 TABLET (10 MG) BY ORAL ROUTE ONCE DAILY TABLET ORAL 1
Qty: 0 | Refills: 0 | Status: ACTIVE | COMMUNITY
Start: 1900-01-01

## 2021-07-20 RX ORDER — INFLIXIMAB-DYYB 100 MG/10ML
INFUSE 10MG/KG INJECTION, POWDER, LYOPHILIZED, FOR SOLUTION INTRAVENOUS
Qty: 1 VIAL | Refills: 6 | Status: ACTIVE | COMMUNITY
Start: 2021-06-09 | End: 2022-01-04

## 2021-07-20 NOTE — HPI-TODAY'S VISIT:
55 yo male comes in for f/u.  Last seen 6/9 and do to stable but active CD, was switched to Inflectra.  6/11/21 trough IFX = 5.8 by [facility] with no ATI. Had first infusion on 7/2 and tolerated it fine. Will continue inflectra and watch course.  Had EGD on 7/1 and this showed severe Grade 4 esophagitis. Never had EGD in past. Does not report much past heartburn. Will rx with longterm PPI omeprazole 40 mg bid and then once better, q day.  Will check TDM before next 10 mg/kg q 4 week infusion late July and if all well f/u at 3 months and when better f/u in 6 month intervals.  Repeat EGD in 6-12 mos and colon in 12 months.  If worsening, he will call and will work  Also,notify us if any adverse reaction to inflectra.

## 2021-08-05 ENCOUNTER — OFFICE VISIT (OUTPATIENT)
Dept: URBAN - METROPOLITAN AREA CLINIC 117 | Facility: CLINIC | Age: 56
End: 2021-08-05
Payer: COMMERCIAL

## 2021-08-05 DIAGNOSIS — K50.80 CROHN'S COLITIS: ICD-10-CM

## 2021-08-05 PROCEDURE — 96413 CHEMO IV INFUSION 1 HR: CPT | Performed by: INTERNAL MEDICINE

## 2021-08-05 PROCEDURE — 96415 CHEMO IV INFUSION ADDL HR: CPT | Performed by: INTERNAL MEDICINE

## 2021-08-05 RX ORDER — AMLODIPINE BESYLATE 10 MG/1
TAKE 1 TABLET (10 MG) BY ORAL ROUTE ONCE DAILY TABLET ORAL 1
Qty: 0 | Refills: 0 | Status: ACTIVE | COMMUNITY
Start: 1900-01-01

## 2021-08-05 RX ORDER — CHOLECALCIFEROL (VITAMIN D3) 50 MCG
1 TABLET TABLET ORAL ONCE A DAY
Status: ACTIVE | COMMUNITY

## 2021-08-05 RX ORDER — OMEPRAZOLE 40 MG/1
1 CAPSULE 30 MINUTES BEFORE MORNING MEAL CAPSULE, DELAYED RELEASE ORAL ONCE A DAY
Qty: 90 CAPSULES | Refills: 1 | Status: ACTIVE | COMMUNITY
Start: 2021-07-20

## 2021-08-05 RX ORDER — AZATHIOPRINE 50 MG/1
TAKE 50 MG TABLET ORAL ONCE DAILY
Qty: 90 TABLETS | Refills: 3 | Status: ACTIVE | COMMUNITY
Start: 2020-10-19 | End: 2021-10-14

## 2021-08-05 RX ORDER — PREDNISONE 10 MG/1
TAKE 1 TABLET (10 MG) BY ORAL ROUTE 4 TIMES PER DAY TABLET ORAL
Qty: 120 | Refills: 0 | Status: ACTIVE | COMMUNITY
Start: 1900-01-01

## 2021-08-05 RX ORDER — INFLIXIMAB 100 MG/10ML
INFUSE 400MG NO LESS THAN 2 HOUR(S) BY INTRAVENOUS ROUTE GIVE TODAY, REPEAT IN 4 WEEKS, THEN GIVE EVERY 8 WEEKS INJECTION, POWDER, LYOPHILIZED, FOR SOLUTION INTRAVENOUS
Qty: 1 | Refills: 0 | Status: ACTIVE | COMMUNITY
Start: 2019-11-25

## 2021-08-05 RX ORDER — INFLIXIMAB-DYYB 100 MG/10ML
INFUSE 10MG/KG INJECTION, POWDER, LYOPHILIZED, FOR SOLUTION INTRAVENOUS
Qty: 1 VIAL | Refills: 6 | Status: ACTIVE | COMMUNITY
Start: 2021-06-09 | End: 2022-01-04

## 2021-08-05 RX ORDER — OMEPRAZOLE 40 MG/1
TAKE ONE CAPSULE CAPSULE, DELAYED RELEASE ORAL TWICE DAILY
Qty: 180 CAPSULES | Refills: 3 | Status: ACTIVE | COMMUNITY
Start: 2021-07-01

## 2021-08-05 RX ORDER — DICYCLOMINE HYDROCHLORIDE 10 MG/1
TAKE 1 CAPSULE BY ORAL ROUTE 3 TIMES A DAY AS NEEDED CAPSULE ORAL
Qty: 90 | Refills: 5 | Status: ACTIVE | COMMUNITY
Start: 2020-01-06

## 2021-08-05 RX ORDER — INFLIXIMAB 100 MG/10ML
10 MG/KG AS DIRECTED INJECTION, POWDER, LYOPHILIZED, FOR SOLUTION INTRAVENOUS
Qty: 1 BAG | Refills: 6 | Status: ACTIVE | COMMUNITY
Start: 2021-06-09 | End: 2021-12-21

## 2021-08-09 LAB
A/G RATIO: 1.4
ALBUMIN: 4.7
ALKALINE PHOSPHATASE: 57
ANTI-INFLIXIMAB ANTIBODY: 42
AST (SGOT): 14
BASO (ABSOLUTE): 0
BASOS: 1
BILIRUBIN, TOTAL: 0.4
BUN/CREATININE RATIO: 10
BUN: 11
C-REACTIVE PROTEIN, QUANT: 7
CALCIUM: 9.8
CHLORIDE: 101
CREATININE: 1.11
EGFR IF AFRICN AM: 84
EGFR IF NONAFRICN AM: 73
EOS (ABSOLUTE): 0.1
EOS: 2
GLOBULIN, TOTAL: 3.4
GLUCOSE: 101
HEMATOCRIT: 46.7
HEMATOLOGY COMMENTS:: (no result)
HEMOGLOBIN: 15.9
IMMATURE CELLS: (no result)
IMMATURE GRANS (ABS): 0
IMMATURE GRANULOCYTES: 0
INFLIXIMAB DRUG LEVEL: 7.4
LYMPHS (ABSOLUTE): 1.2
LYMPHS: 18
MCH: 28.4
MCHC: 34
MCV: 83
MONOCYTES(ABSOLUTE): 0.7
MONOCYTES: 10
NEUTROPHILS (ABSOLUTE): 4.8
NEUTROPHILS: 69
NRBC: (no result)
PLATELETS: 313
POTASSIUM: 3.5
PROTEIN, TOTAL: 8.1
RBC: 5.6
RDW: 14.7
SEDIMENTATION RATE-WESTERGREN: 33
SODIUM: 141
WBC: 6.9

## 2021-08-17 ENCOUNTER — TELEPHONE ENCOUNTER (OUTPATIENT)
Dept: URBAN - METROPOLITAN AREA CLINIC 23 | Facility: CLINIC | Age: 56
End: 2021-08-17

## 2021-08-23 ENCOUNTER — TELEPHONE ENCOUNTER (OUTPATIENT)
Dept: URBAN - METROPOLITAN AREA CLINIC 92 | Facility: CLINIC | Age: 56
End: 2021-08-23

## 2021-08-23 ENCOUNTER — TELEPHONE ENCOUNTER (OUTPATIENT)
Dept: URBAN - METROPOLITAN AREA CLINIC 98 | Facility: CLINIC | Age: 56
End: 2021-08-23

## 2021-08-23 LAB
A/G RATIO: 1.4
ALBUMIN: 4.4
ALKALINE PHOSPHATASE: 53
ALT (SGPT): 8
AMYLASE: 105
AST (SGOT): 10
BASO (ABSOLUTE): 0.1
BASOS: 1
BILIRUBIN, TOTAL: 0.3
BUN/CREATININE RATIO: 10
BUN: 12
C-REACTIVE PROTEIN, QUANT: 4
CALCIUM: 9.3
CARBON DIOXIDE, TOTAL: 22
CHLORIDE: 102
CREATININE: 1.17
EGFR IF AFRICN AM: 80
EGFR IF NONAFRICN AM: 69
EOS (ABSOLUTE): 0.2
EOS: 2
FERRITIN, SERUM: 33
GLOBULIN, TOTAL: 3.1
GLUCOSE: 115
HEMATOCRIT: 41
HEMATOLOGY COMMENTS:: (no result)
HEMOGLOBIN: 13
IMMATURE CELLS: (no result)
IMMATURE GRANS (ABS): 0
IMMATURE GRANULOCYTES: 0
IRON BIND.CAP.(TIBC): 361
IRON SATURATION: 10
IRON: 35
LIPASE: 50
LYMPHS (ABSOLUTE): 1.6
LYMPHS: 23
MCH: 25.2
MCHC: 31.7
MCV: 80
MONOCYTES(ABSOLUTE): 0.6
MONOCYTES: 8
NEUTROPHILS (ABSOLUTE): 4.7
NEUTROPHILS: 66
NRBC: (no result)
PLATELETS: 329
POTASSIUM: 3.4
PROTEIN, TOTAL: 7.5
RBC: 5.15
RDW: 13.5
SEDIMENTATION RATE-WESTERGREN: 39
SODIUM: 142
UIBC: 326
WBC: 7.2

## 2021-08-23 RX ORDER — OMEPRAZOLE 40 MG/1
TAKE ONE CAPSULE CAPSULE, DELAYED RELEASE ORAL TWICE DAILY
Qty: 180 CAPSULES | Refills: 3 | Status: DISCONTINUED | COMMUNITY
Start: 2021-07-01

## 2021-08-23 RX ORDER — CHOLECALCIFEROL (VITAMIN D3) 50 MCG
1 TABLET TABLET ORAL ONCE A DAY
Status: ACTIVE | COMMUNITY

## 2021-08-23 RX ORDER — DEXLANSOPRAZOLE 60 MG/1
1 CAPSULE CAPSULE, DELAYED RELEASE ORAL ONCE A DAY
Qty: 90 CAPSULE | Refills: 1 | OUTPATIENT
Start: 2021-08-23

## 2021-08-23 RX ORDER — INFLIXIMAB-DYYB 100 MG/10ML
INFUSE 10MG/KG INJECTION, POWDER, LYOPHILIZED, FOR SOLUTION INTRAVENOUS
Qty: 1 VIAL | Refills: 6 | Status: ACTIVE | COMMUNITY
Start: 2021-06-09 | End: 2022-01-04

## 2021-08-23 RX ORDER — PREDNISONE 10 MG/1
TAKE 1 TABLET (10 MG) BY ORAL ROUTE 4 TIMES PER DAY TABLET ORAL
Qty: 120 | Refills: 0 | Status: ACTIVE | COMMUNITY
Start: 1900-01-01

## 2021-08-23 RX ORDER — AMLODIPINE BESYLATE 10 MG/1
TAKE 1 TABLET (10 MG) BY ORAL ROUTE ONCE DAILY TABLET ORAL 1
Qty: 0 | Refills: 0 | Status: ACTIVE | COMMUNITY
Start: 1900-01-01

## 2021-08-23 RX ORDER — DICYCLOMINE HYDROCHLORIDE 10 MG/1
TAKE 1 CAPSULE BY ORAL ROUTE 3 TIMES A DAY AS NEEDED CAPSULE ORAL
Qty: 90 | Refills: 5 | Status: ACTIVE | COMMUNITY
Start: 2020-01-06

## 2021-08-23 RX ORDER — OMEPRAZOLE 40 MG/1
1 CAPSULE 30 MINUTES BEFORE MORNING MEAL CAPSULE, DELAYED RELEASE ORAL ONCE A DAY
Qty: 90 CAPSULES | Refills: 1 | Status: DISCONTINUED | COMMUNITY
Start: 2021-07-20

## 2021-08-23 RX ORDER — AZATHIOPRINE 50 MG/1
TAKE 50 MG TABLET ORAL ONCE DAILY
Qty: 90 TABLETS | Refills: 3 | Status: ACTIVE | COMMUNITY
Start: 2020-10-19 | End: 2021-10-14

## 2021-08-23 RX ORDER — DEXLANSOPRAZOLE 60 MG/1
1 CAPSULE CAPSULE, DELAYED RELEASE ORAL ONCE A DAY
Qty: 90 CAPSULE | Refills: 1 | Status: ACTIVE | COMMUNITY
Start: 2021-08-23

## 2021-08-23 RX ORDER — INFLIXIMAB 100 MG/10ML
10 MG/KG AS DIRECTED INJECTION, POWDER, LYOPHILIZED, FOR SOLUTION INTRAVENOUS
Qty: 1 BAG | Refills: 6 | Status: DISCONTINUED | COMMUNITY
Start: 2021-06-09 | End: 2021-12-21

## 2021-08-23 RX ORDER — INFLIXIMAB 100 MG/10ML
INFUSE 400MG NO LESS THAN 2 HOUR(S) BY INTRAVENOUS ROUTE GIVE TODAY, REPEAT IN 4 WEEKS, THEN GIVE EVERY 8 WEEKS INJECTION, POWDER, LYOPHILIZED, FOR SOLUTION INTRAVENOUS
Qty: 1 | Refills: 0 | Status: DISCONTINUED | COMMUNITY
Start: 2019-11-25

## 2021-08-24 ENCOUNTER — TELEPHONE ENCOUNTER (OUTPATIENT)
Dept: URBAN - METROPOLITAN AREA CLINIC 92 | Facility: CLINIC | Age: 56
End: 2021-08-24

## 2021-09-02 ENCOUNTER — OFFICE VISIT (OUTPATIENT)
Dept: URBAN - METROPOLITAN AREA CLINIC 117 | Facility: CLINIC | Age: 56
End: 2021-09-02
Payer: COMMERCIAL

## 2021-09-02 DIAGNOSIS — K50.80 CROHN'S COLITIS: ICD-10-CM

## 2021-09-02 PROCEDURE — 96413 CHEMO IV INFUSION 1 HR: CPT | Performed by: INTERNAL MEDICINE

## 2021-09-02 PROCEDURE — 96415 CHEMO IV INFUSION ADDL HR: CPT | Performed by: INTERNAL MEDICINE

## 2021-09-02 RX ORDER — CHOLECALCIFEROL (VITAMIN D3) 50 MCG
1 TABLET TABLET ORAL ONCE A DAY
Status: ACTIVE | COMMUNITY

## 2021-09-02 RX ORDER — PREDNISONE 10 MG/1
TAKE 1 TABLET (10 MG) BY ORAL ROUTE 4 TIMES PER DAY TABLET ORAL
Qty: 120 | Refills: 0 | Status: ACTIVE | COMMUNITY
Start: 1900-01-01

## 2021-09-02 RX ORDER — DEXLANSOPRAZOLE 60 MG/1
1 CAPSULE CAPSULE, DELAYED RELEASE ORAL ONCE A DAY
Qty: 90 CAPSULE | Refills: 1 | Status: ACTIVE | COMMUNITY
Start: 2021-08-23

## 2021-09-02 RX ORDER — AMLODIPINE BESYLATE 10 MG/1
TAKE 1 TABLET (10 MG) BY ORAL ROUTE ONCE DAILY TABLET ORAL 1
Qty: 0 | Refills: 0 | Status: ACTIVE | COMMUNITY
Start: 1900-01-01

## 2021-09-02 RX ORDER — AZATHIOPRINE 50 MG/1
TAKE 50 MG TABLET ORAL ONCE DAILY
Qty: 90 TABLETS | Refills: 3 | Status: ACTIVE | COMMUNITY
Start: 2020-10-19 | End: 2021-10-14

## 2021-09-02 RX ORDER — DICYCLOMINE HYDROCHLORIDE 10 MG/1
TAKE 1 CAPSULE BY ORAL ROUTE 3 TIMES A DAY AS NEEDED CAPSULE ORAL
Qty: 90 | Refills: 5 | Status: ACTIVE | COMMUNITY
Start: 2020-01-06

## 2021-09-02 RX ORDER — INFLIXIMAB-DYYB 100 MG/10ML
INFUSE 10MG/KG INJECTION, POWDER, LYOPHILIZED, FOR SOLUTION INTRAVENOUS
Qty: 1 VIAL | Refills: 6 | Status: ACTIVE | COMMUNITY
Start: 2021-06-09 | End: 2022-01-04

## 2021-10-06 ENCOUNTER — OFFICE VISIT (OUTPATIENT)
Dept: URBAN - METROPOLITAN AREA CLINIC 91 | Facility: CLINIC | Age: 56
End: 2021-10-06
Payer: COMMERCIAL

## 2021-10-06 VITALS
HEART RATE: 101 BPM | BODY MASS INDEX: 29.86 KG/M2 | WEIGHT: 197 LBS | SYSTOLIC BLOOD PRESSURE: 159 MMHG | RESPIRATION RATE: 18 BRPM | DIASTOLIC BLOOD PRESSURE: 97 MMHG | TEMPERATURE: 98.8 F | HEIGHT: 68 IN

## 2021-10-06 DIAGNOSIS — K50.80 CROHN'S COLITIS: ICD-10-CM

## 2021-10-06 PROCEDURE — 96413 CHEMO IV INFUSION 1 HR: CPT | Performed by: INTERNAL MEDICINE

## 2021-10-06 PROCEDURE — 96415 CHEMO IV INFUSION ADDL HR: CPT | Performed by: INTERNAL MEDICINE

## 2021-10-06 RX ORDER — DEXLANSOPRAZOLE 60 MG/1
1 CAPSULE CAPSULE, DELAYED RELEASE ORAL ONCE A DAY
Qty: 90 CAPSULE | Refills: 1 | Status: ACTIVE | COMMUNITY
Start: 2021-08-23

## 2021-10-06 RX ORDER — INFLIXIMAB-DYYB 100 MG/10ML
INFUSE 10MG/KG INJECTION, POWDER, LYOPHILIZED, FOR SOLUTION INTRAVENOUS
Qty: 1 VIAL | Refills: 6 | Status: ACTIVE | COMMUNITY
Start: 2021-06-09 | End: 2022-01-04

## 2021-10-06 RX ORDER — AMLODIPINE BESYLATE 10 MG/1
TAKE 1 TABLET (10 MG) BY ORAL ROUTE ONCE DAILY TABLET ORAL 1
Qty: 0 | Refills: 0 | Status: ACTIVE | COMMUNITY
Start: 1900-01-01

## 2021-10-06 RX ORDER — AZATHIOPRINE 50 MG/1
TAKE 50 MG TABLET ORAL ONCE DAILY
Qty: 90 TABLETS | Refills: 3 | Status: ACTIVE | COMMUNITY
Start: 2020-10-19 | End: 2021-10-14

## 2021-10-06 RX ORDER — PREDNISONE 10 MG/1
TAKE 1 TABLET (10 MG) BY ORAL ROUTE 4 TIMES PER DAY TABLET ORAL
Qty: 120 | Refills: 0 | Status: ACTIVE | COMMUNITY
Start: 1900-01-01

## 2021-10-06 RX ORDER — CHOLECALCIFEROL (VITAMIN D3) 50 MCG
1 TABLET TABLET ORAL ONCE A DAY
Status: ACTIVE | COMMUNITY

## 2021-10-06 RX ORDER — DICYCLOMINE HYDROCHLORIDE 10 MG/1
TAKE 1 CAPSULE BY ORAL ROUTE 3 TIMES A DAY AS NEEDED CAPSULE ORAL
Qty: 90 | Refills: 5 | Status: ACTIVE | COMMUNITY
Start: 2020-01-06

## 2021-11-03 ENCOUNTER — OFFICE VISIT (OUTPATIENT)
Dept: URBAN - METROPOLITAN AREA CLINIC 117 | Facility: CLINIC | Age: 56
End: 2021-11-03

## 2021-11-04 ENCOUNTER — LAB OUTSIDE AN ENCOUNTER (OUTPATIENT)
Dept: URBAN - METROPOLITAN AREA CLINIC 98 | Facility: CLINIC | Age: 56
End: 2021-11-04

## 2021-11-05 ENCOUNTER — OFFICE VISIT (OUTPATIENT)
Dept: URBAN - METROPOLITAN AREA CLINIC 117 | Facility: CLINIC | Age: 56
End: 2021-11-05
Payer: COMMERCIAL

## 2021-11-05 DIAGNOSIS — K50.80 CROHN'S COLITIS: ICD-10-CM

## 2021-11-05 PROCEDURE — 96415 CHEMO IV INFUSION ADDL HR: CPT | Performed by: INTERNAL MEDICINE

## 2021-11-05 PROCEDURE — 96413 CHEMO IV INFUSION 1 HR: CPT | Performed by: INTERNAL MEDICINE

## 2021-11-05 RX ORDER — AMLODIPINE BESYLATE 10 MG/1
TAKE 1 TABLET (10 MG) BY ORAL ROUTE ONCE DAILY TABLET ORAL 1
Qty: 0 | Refills: 0 | Status: ACTIVE | COMMUNITY
Start: 1900-01-01

## 2021-11-05 RX ORDER — INFLIXIMAB-DYYB 100 MG/10ML
INFUSE 10MG/KG INJECTION, POWDER, LYOPHILIZED, FOR SOLUTION INTRAVENOUS
Qty: 1 VIAL | Refills: 6 | Status: ACTIVE | COMMUNITY
Start: 2021-06-09 | End: 2022-01-04

## 2021-11-05 RX ORDER — DEXLANSOPRAZOLE 60 MG/1
1 CAPSULE CAPSULE, DELAYED RELEASE ORAL ONCE A DAY
Qty: 90 CAPSULE | Refills: 1 | Status: ACTIVE | COMMUNITY
Start: 2021-08-23

## 2021-11-05 RX ORDER — CHOLECALCIFEROL (VITAMIN D3) 50 MCG
1 TABLET TABLET ORAL ONCE A DAY
Status: ACTIVE | COMMUNITY

## 2021-11-05 RX ORDER — DICYCLOMINE HYDROCHLORIDE 10 MG/1
TAKE 1 CAPSULE BY ORAL ROUTE 3 TIMES A DAY AS NEEDED CAPSULE ORAL
Qty: 90 | Refills: 5 | Status: ACTIVE | COMMUNITY
Start: 2020-01-06

## 2021-11-05 RX ORDER — PREDNISONE 10 MG/1
TAKE 1 TABLET (10 MG) BY ORAL ROUTE 4 TIMES PER DAY TABLET ORAL
Qty: 120 | Refills: 0 | Status: ACTIVE | COMMUNITY
Start: 1900-01-01

## 2021-11-10 ENCOUNTER — OFFICE VISIT (OUTPATIENT)
Dept: URBAN - METROPOLITAN AREA CLINIC 114 | Facility: CLINIC | Age: 56
End: 2021-11-10

## 2021-11-11 LAB
A/G RATIO: 1.2
ALBUMIN: 4.8
ALKALINE PHOSPHATASE: 72
ANTI-INFLIXIMAB ANTIBODY: 53
AST (SGOT): 18
BASO (ABSOLUTE): 0
BASOS: 1
BILIRUBIN, TOTAL: 0.3
BUN/CREATININE RATIO: 12
BUN: 12
C-REACTIVE PROTEIN, QUANT: 7
CALCIUM: 10.1
CHLORIDE: 100
CREATININE: 1.04
EGFR IF AFRICN AM: 91
EGFR IF NONAFRICN AM: 79
EOS (ABSOLUTE): 0.1
EOS: 2
GLOBULIN, TOTAL: 3.9
GLUCOSE: 129
HEMATOCRIT: 50.5
HEMATOLOGY COMMENTS:: (no result)
HEMOGLOBIN: 16.2
IMMATURE CELLS: (no result)
IMMATURE GRANS (ABS): 0
IMMATURE GRANULOCYTES: 0
INFLIXIMAB DRUG LEVEL: 12
LYMPHS (ABSOLUTE): 1.3
LYMPHS: 20
MCH: 26.7
MCHC: 32.1
MCV: 83
MONOCYTES(ABSOLUTE): 0.8
MONOCYTES: 11
NEUTROPHILS (ABSOLUTE): 4.4
NEUTROPHILS: 66
NRBC: (no result)
PLATELETS: 347
POTASSIUM: 4
PROTEIN, TOTAL: 8.7
RBC: 6.07
RDW: 14.5
SEDIMENTATION RATE-WESTERGREN: 43
SODIUM: 140
WBC: 6.7

## 2021-12-10 ENCOUNTER — OFFICE VISIT (OUTPATIENT)
Dept: URBAN - METROPOLITAN AREA CLINIC 117 | Facility: CLINIC | Age: 56
End: 2021-12-10
Payer: COMMERCIAL

## 2021-12-10 DIAGNOSIS — K50.80 CROHN'S COLITIS: ICD-10-CM

## 2021-12-10 PROCEDURE — 96415 CHEMO IV INFUSION ADDL HR: CPT | Performed by: INTERNAL MEDICINE

## 2021-12-10 PROCEDURE — 96413 CHEMO IV INFUSION 1 HR: CPT | Performed by: INTERNAL MEDICINE

## 2021-12-10 RX ORDER — PREDNISONE 10 MG/1
TAKE 1 TABLET (10 MG) BY ORAL ROUTE 4 TIMES PER DAY TABLET ORAL
Qty: 120 | Refills: 0 | Status: ACTIVE | COMMUNITY
Start: 1900-01-01

## 2021-12-10 RX ORDER — AMLODIPINE BESYLATE 10 MG/1
TAKE 1 TABLET (10 MG) BY ORAL ROUTE ONCE DAILY TABLET ORAL 1
Qty: 0 | Refills: 0 | Status: ACTIVE | COMMUNITY
Start: 1900-01-01

## 2021-12-10 RX ORDER — INFLIXIMAB-DYYB 100 MG/10ML
INFUSE 10MG/KG INJECTION, POWDER, LYOPHILIZED, FOR SOLUTION INTRAVENOUS
Qty: 1 VIAL | Refills: 6 | Status: ACTIVE | COMMUNITY
Start: 2021-06-09 | End: 2022-01-04

## 2021-12-10 RX ORDER — DICYCLOMINE HYDROCHLORIDE 10 MG/1
TAKE 1 CAPSULE BY ORAL ROUTE 3 TIMES A DAY AS NEEDED CAPSULE ORAL
Qty: 90 | Refills: 5 | Status: ACTIVE | COMMUNITY
Start: 2020-01-06

## 2021-12-10 RX ORDER — DEXLANSOPRAZOLE 60 MG/1
1 CAPSULE CAPSULE, DELAYED RELEASE ORAL ONCE A DAY
Qty: 90 CAPSULE | Refills: 1 | Status: ACTIVE | COMMUNITY
Start: 2021-08-23

## 2021-12-10 RX ORDER — CHOLECALCIFEROL (VITAMIN D3) 50 MCG
1 TABLET TABLET ORAL ONCE A DAY
Status: ACTIVE | COMMUNITY

## 2022-01-12 ENCOUNTER — OFFICE VISIT (OUTPATIENT)
Dept: URBAN - METROPOLITAN AREA CLINIC 117 | Facility: CLINIC | Age: 57
End: 2022-01-12
Payer: COMMERCIAL

## 2022-01-12 VITALS — BODY MASS INDEX: 30.62 KG/M2 | WEIGHT: 202 LBS | HEIGHT: 68 IN | RESPIRATION RATE: 20 BRPM | TEMPERATURE: 98.2 F

## 2022-01-12 DIAGNOSIS — K50.80 CROHN'S COLITIS: ICD-10-CM

## 2022-01-12 PROCEDURE — 96415 CHEMO IV INFUSION ADDL HR: CPT | Performed by: INTERNAL MEDICINE

## 2022-01-12 PROCEDURE — 96413 CHEMO IV INFUSION 1 HR: CPT | Performed by: INTERNAL MEDICINE

## 2022-01-12 RX ORDER — DICYCLOMINE HYDROCHLORIDE 10 MG/1
TAKE 1 CAPSULE BY ORAL ROUTE 3 TIMES A DAY AS NEEDED CAPSULE ORAL
Qty: 90 | Refills: 5 | Status: ACTIVE | COMMUNITY
Start: 2020-01-06

## 2022-01-12 RX ORDER — DEXLANSOPRAZOLE 60 MG/1
1 CAPSULE CAPSULE, DELAYED RELEASE ORAL ONCE A DAY
Qty: 90 CAPSULE | Refills: 1 | Status: ACTIVE | COMMUNITY
Start: 2021-08-23

## 2022-01-12 RX ORDER — AMLODIPINE BESYLATE 10 MG/1
TAKE 1 TABLET (10 MG) BY ORAL ROUTE ONCE DAILY TABLET ORAL 1
Qty: 0 | Refills: 0 | Status: ACTIVE | COMMUNITY
Start: 1900-01-01

## 2022-01-12 RX ORDER — PREDNISONE 10 MG/1
TAKE 1 TABLET (10 MG) BY ORAL ROUTE 4 TIMES PER DAY TABLET ORAL
Qty: 120 | Refills: 0 | Status: ACTIVE | COMMUNITY
Start: 1900-01-01

## 2022-01-12 RX ORDER — CHOLECALCIFEROL (VITAMIN D3) 50 MCG
1 TABLET TABLET ORAL ONCE A DAY
Status: ACTIVE | COMMUNITY

## 2022-01-19 ENCOUNTER — TELEPHONE ENCOUNTER (OUTPATIENT)
Dept: URBAN - METROPOLITAN AREA CLINIC 98 | Facility: CLINIC | Age: 57
End: 2022-01-19

## 2022-01-19 RX ORDER — AZATHIOPRINE 50 MG/1
TAKE 50 MG TABLET ORAL ONCE DAILY
Qty: 90 TABLETS | Refills: 3
Start: 2020-10-19 | End: 2023-01-14

## 2022-02-04 ENCOUNTER — OFFICE VISIT (OUTPATIENT)
Dept: URBAN - METROPOLITAN AREA CLINIC 117 | Facility: CLINIC | Age: 57
End: 2022-02-04
Payer: COMMERCIAL

## 2022-02-04 DIAGNOSIS — K50.80 CROHN'S COLITIS: ICD-10-CM

## 2022-02-04 PROCEDURE — 96415 CHEMO IV INFUSION ADDL HR: CPT | Performed by: INTERNAL MEDICINE

## 2022-02-04 PROCEDURE — 96413 CHEMO IV INFUSION 1 HR: CPT | Performed by: INTERNAL MEDICINE

## 2022-02-04 RX ORDER — AMLODIPINE BESYLATE 10 MG/1
TAKE 1 TABLET (10 MG) BY ORAL ROUTE ONCE DAILY TABLET ORAL 1
Qty: 0 | Refills: 0 | Status: ACTIVE | COMMUNITY
Start: 1900-01-01

## 2022-02-04 RX ORDER — DICYCLOMINE HYDROCHLORIDE 10 MG/1
TAKE 1 CAPSULE BY ORAL ROUTE 3 TIMES A DAY AS NEEDED CAPSULE ORAL
Qty: 90 | Refills: 5 | Status: ACTIVE | COMMUNITY
Start: 2020-01-06

## 2022-02-04 RX ORDER — CHOLECALCIFEROL (VITAMIN D3) 50 MCG
1 TABLET TABLET ORAL ONCE A DAY
Status: ACTIVE | COMMUNITY

## 2022-02-04 RX ORDER — DEXLANSOPRAZOLE 60 MG/1
1 CAPSULE CAPSULE, DELAYED RELEASE ORAL ONCE A DAY
Qty: 90 CAPSULE | Refills: 1 | Status: ACTIVE | COMMUNITY
Start: 2021-08-23

## 2022-02-04 RX ORDER — PREDNISONE 10 MG/1
TAKE 1 TABLET (10 MG) BY ORAL ROUTE 4 TIMES PER DAY TABLET ORAL
Qty: 120 | Refills: 0 | Status: ACTIVE | COMMUNITY
Start: 1900-01-01

## 2022-02-04 RX ORDER — AZATHIOPRINE 50 MG/1
TAKE 50 MG TABLET ORAL ONCE DAILY
Qty: 90 TABLETS | Refills: 3 | Status: ACTIVE | COMMUNITY
Start: 2020-10-19 | End: 2023-01-14

## 2022-03-04 ENCOUNTER — OFFICE VISIT (OUTPATIENT)
Dept: URBAN - METROPOLITAN AREA CLINIC 117 | Facility: CLINIC | Age: 57
End: 2022-03-04
Payer: COMMERCIAL

## 2022-03-04 DIAGNOSIS — K50.80 CROHN'S COLITIS: ICD-10-CM

## 2022-03-04 PROCEDURE — 96415 CHEMO IV INFUSION ADDL HR: CPT | Performed by: INTERNAL MEDICINE

## 2022-03-04 PROCEDURE — 96413 CHEMO IV INFUSION 1 HR: CPT | Performed by: INTERNAL MEDICINE

## 2022-03-04 RX ORDER — DICYCLOMINE HYDROCHLORIDE 10 MG/1
TAKE 1 CAPSULE BY ORAL ROUTE 3 TIMES A DAY AS NEEDED CAPSULE ORAL
Qty: 90 | Refills: 5 | Status: ACTIVE | COMMUNITY
Start: 2020-01-06

## 2022-03-04 RX ORDER — DEXLANSOPRAZOLE 60 MG/1
1 CAPSULE CAPSULE, DELAYED RELEASE ORAL ONCE A DAY
Qty: 90 CAPSULE | Refills: 1 | Status: ACTIVE | COMMUNITY
Start: 2021-08-23

## 2022-03-04 RX ORDER — AMLODIPINE BESYLATE 10 MG/1
TAKE 1 TABLET (10 MG) BY ORAL ROUTE ONCE DAILY TABLET ORAL 1
Qty: 0 | Refills: 0 | Status: ACTIVE | COMMUNITY
Start: 1900-01-01

## 2022-03-04 RX ORDER — AZATHIOPRINE 50 MG/1
TAKE 50 MG TABLET ORAL ONCE DAILY
Qty: 90 TABLETS | Refills: 3 | Status: ACTIVE | COMMUNITY
Start: 2020-10-19 | End: 2023-01-14

## 2022-03-04 RX ORDER — CHOLECALCIFEROL (VITAMIN D3) 50 MCG
1 TABLET TABLET ORAL ONCE A DAY
Status: ACTIVE | COMMUNITY

## 2022-03-04 RX ORDER — PREDNISONE 10 MG/1
TAKE 1 TABLET (10 MG) BY ORAL ROUTE 4 TIMES PER DAY TABLET ORAL
Qty: 120 | Refills: 0 | Status: ACTIVE | COMMUNITY
Start: 1900-01-01

## 2022-03-31 ENCOUNTER — P2P PATIENT RECORD (OUTPATIENT)
Age: 57
End: 2022-03-31

## 2022-04-04 ENCOUNTER — TELEPHONE ENCOUNTER (OUTPATIENT)
Dept: URBAN - METROPOLITAN AREA CLINIC 98 | Facility: CLINIC | Age: 57
End: 2022-04-04

## 2022-04-04 RX ORDER — DEXLANSOPRAZOLE 60 MG/1
1 CAPSULE CAPSULE, DELAYED RELEASE ORAL ONCE A DAY
Qty: 90 CAPSULES | Refills: 3
Start: 2021-08-23

## 2022-04-08 ENCOUNTER — OFFICE VISIT (OUTPATIENT)
Dept: URBAN - METROPOLITAN AREA CLINIC 117 | Facility: CLINIC | Age: 57
End: 2022-04-08
Payer: COMMERCIAL

## 2022-04-08 DIAGNOSIS — K50.80 CROHN'S COLITIS: ICD-10-CM

## 2022-04-08 PROCEDURE — 96413 CHEMO IV INFUSION 1 HR: CPT | Performed by: INTERNAL MEDICINE

## 2022-04-08 PROCEDURE — 96415 CHEMO IV INFUSION ADDL HR: CPT | Performed by: INTERNAL MEDICINE

## 2022-04-08 RX ORDER — CHOLECALCIFEROL (VITAMIN D3) 50 MCG
1 TABLET TABLET ORAL ONCE A DAY
Status: ACTIVE | COMMUNITY

## 2022-04-08 RX ORDER — AMLODIPINE BESYLATE 10 MG/1
TAKE 1 TABLET (10 MG) BY ORAL ROUTE ONCE DAILY TABLET ORAL 1
Qty: 0 | Refills: 0 | Status: ACTIVE | COMMUNITY
Start: 1900-01-01

## 2022-04-08 RX ORDER — PREDNISONE 10 MG/1
TAKE 1 TABLET (10 MG) BY ORAL ROUTE 4 TIMES PER DAY TABLET ORAL
Qty: 120 | Refills: 0 | Status: ACTIVE | COMMUNITY
Start: 1900-01-01

## 2022-04-08 RX ORDER — AZATHIOPRINE 50 MG/1
TAKE 50 MG TABLET ORAL ONCE DAILY
Qty: 90 TABLETS | Refills: 3 | Status: ACTIVE | COMMUNITY
Start: 2020-10-19 | End: 2023-01-14

## 2022-04-08 RX ORDER — DEXLANSOPRAZOLE 60 MG/1
1 CAPSULE CAPSULE, DELAYED RELEASE ORAL ONCE A DAY
Qty: 90 CAPSULES | Refills: 3 | Status: ACTIVE | COMMUNITY
Start: 2021-08-23

## 2022-04-08 RX ORDER — DICYCLOMINE HYDROCHLORIDE 10 MG/1
TAKE 1 CAPSULE BY ORAL ROUTE 3 TIMES A DAY AS NEEDED CAPSULE ORAL
Qty: 90 | Refills: 5 | Status: ACTIVE | COMMUNITY
Start: 2020-01-06

## 2022-05-06 ENCOUNTER — OFFICE VISIT (OUTPATIENT)
Dept: URBAN - METROPOLITAN AREA CLINIC 117 | Facility: CLINIC | Age: 57
End: 2022-05-06
Payer: COMMERCIAL

## 2022-05-06 DIAGNOSIS — K50.80 CROHN'S COLITIS: ICD-10-CM

## 2022-05-06 PROCEDURE — 96413 CHEMO IV INFUSION 1 HR: CPT | Performed by: INTERNAL MEDICINE

## 2022-05-06 PROCEDURE — 96415 CHEMO IV INFUSION ADDL HR: CPT | Performed by: INTERNAL MEDICINE

## 2022-05-06 RX ORDER — AZATHIOPRINE 50 MG/1
TAKE 50 MG TABLET ORAL ONCE DAILY
Qty: 90 TABLETS | Refills: 3 | Status: ACTIVE | COMMUNITY
Start: 2020-10-19 | End: 2023-01-14

## 2022-05-06 RX ORDER — DICYCLOMINE HYDROCHLORIDE 10 MG/1
TAKE 1 CAPSULE BY ORAL ROUTE 3 TIMES A DAY AS NEEDED CAPSULE ORAL
Qty: 90 | Refills: 5 | Status: ACTIVE | COMMUNITY
Start: 2020-01-06

## 2022-05-06 RX ORDER — CHOLECALCIFEROL (VITAMIN D3) 50 MCG
1 TABLET TABLET ORAL ONCE A DAY
Status: ACTIVE | COMMUNITY

## 2022-05-06 RX ORDER — AMLODIPINE BESYLATE 10 MG/1
TAKE 1 TABLET (10 MG) BY ORAL ROUTE ONCE DAILY TABLET ORAL 1
Qty: 0 | Refills: 0 | Status: ACTIVE | COMMUNITY
Start: 1900-01-01

## 2022-05-06 RX ORDER — PREDNISONE 10 MG/1
TAKE 1 TABLET (10 MG) BY ORAL ROUTE 4 TIMES PER DAY TABLET ORAL
Qty: 120 | Refills: 0 | Status: ACTIVE | COMMUNITY
Start: 1900-01-01

## 2022-05-06 RX ORDER — DEXLANSOPRAZOLE 60 MG/1
1 CAPSULE CAPSULE, DELAYED RELEASE ORAL ONCE A DAY
Qty: 90 CAPSULES | Refills: 3 | Status: ACTIVE | COMMUNITY
Start: 2021-08-23

## 2022-06-14 ENCOUNTER — OFFICE VISIT (OUTPATIENT)
Dept: URBAN - METROPOLITAN AREA CLINIC 98 | Facility: CLINIC | Age: 57
End: 2022-06-14
Payer: COMMERCIAL

## 2022-06-14 ENCOUNTER — WEB ENCOUNTER (OUTPATIENT)
Dept: URBAN - METROPOLITAN AREA CLINIC 98 | Facility: CLINIC | Age: 57
End: 2022-06-14

## 2022-06-14 VITALS — HEIGHT: 68 IN | WEIGHT: 166.6 LBS | BODY MASS INDEX: 25.25 KG/M2

## 2022-06-14 DIAGNOSIS — K76.0 FATTY LIVER: ICD-10-CM

## 2022-06-14 DIAGNOSIS — K51.80 OTHER ULCERATIVE COLITIS WITHOUT COMPLICATIONS: ICD-10-CM

## 2022-06-14 DIAGNOSIS — K92.1 BLOOD IN STOOL: ICD-10-CM

## 2022-06-14 DIAGNOSIS — Z79.899 IMMUNOSUPPRESSION DUE TO DRUG THERAPY: ICD-10-CM

## 2022-06-14 DIAGNOSIS — R19.7 DIARRHEA: ICD-10-CM

## 2022-06-14 DIAGNOSIS — R10.84 ABDOMINAL PAIN, GENERALIZED: ICD-10-CM

## 2022-06-14 DIAGNOSIS — K50.10 CROHN'S DISEASE OF COLON WITHOUT COMPLICATION: ICD-10-CM

## 2022-06-14 DIAGNOSIS — K50.10 CROHN'S COLITIS: ICD-10-CM

## 2022-06-14 PROCEDURE — 99214 OFFICE O/P EST MOD 30 MIN: CPT | Performed by: INTERNAL MEDICINE

## 2022-06-14 RX ORDER — PREDNISONE 10 MG/1
TAKE 1 TABLET (10 MG) BY ORAL ROUTE 4 TIMES PER DAY TABLET ORAL
Qty: 120 | Refills: 0 | Status: ACTIVE | COMMUNITY
Start: 1900-01-01

## 2022-06-14 RX ORDER — DICYCLOMINE HYDROCHLORIDE 10 MG/1
TAKE 1 CAPSULE BY ORAL ROUTE 3 TIMES A DAY AS NEEDED CAPSULE ORAL
Qty: 90 | Refills: 5 | Status: ACTIVE | COMMUNITY
Start: 2020-01-06

## 2022-06-14 RX ORDER — DEXLANSOPRAZOLE 60 MG/1
1 CAPSULE CAPSULE, DELAYED RELEASE ORAL ONCE A DAY
Qty: 90 CAPSULES | Refills: 3 | Status: ACTIVE | COMMUNITY
Start: 2021-08-23

## 2022-06-14 RX ORDER — AZATHIOPRINE 50 MG/1
TAKE 50 MG TABLET ORAL ONCE DAILY
Qty: 90 TABLETS | Refills: 3 | Status: ACTIVE | COMMUNITY
Start: 2020-10-19 | End: 2023-01-14

## 2022-06-14 RX ORDER — AMLODIPINE BESYLATE 10 MG/1
TAKE 1 TABLET (10 MG) BY ORAL ROUTE ONCE DAILY TABLET ORAL 1
Qty: 0 | Refills: 0 | Status: ACTIVE | COMMUNITY
Start: 1900-01-01

## 2022-06-14 RX ORDER — CHOLECALCIFEROL (VITAMIN D3) 50 MCG
1 TABLET TABLET ORAL ONCE A DAY
Status: ACTIVE | COMMUNITY

## 2022-06-14 NOTE — HPI-TODAY'S VISIT:
58 yo male with CD comes in for 1 year f/u. Doing well on inflectra, which he says does not work quite as well as Remicade. Modified diet in January and that made great improvement. Secret is to get rid of the sauce, eliminating CHO, starches, sugars, avoid processed foods. Lots of brocolli, asparagus, ruminant meats,  Last Remicade infusion-- 10 mg/kg BW q 4 w Remicade. Has one more authorized infusion.  H/o esophageal erosions, he did not tolerate omep and dexilant was not covered by insurance, but his diet has alleviated esoph syx too.  The change of diet helped him so he can job and do other exercise. In May, his walking and jogging totaled 70 miles. =============== 7/20/21  57 yo male comes in for f/u.  Last seen 6/9 and do to stable but active CD, was switched to Inflectra.  6/11/21 trough IFX = 5.8 by [facility] with no ATI. Had first infusion on 7/2 and tolerated it fine. Will continue inflectra and watch course.  Had EGD on 7/1 and this showed severe Grade 4 esophagitis. Never had EGD in past. Does not report much past heartburn. Will rx with longterm PPI omeprazole 40 mg bid and then once better, q day.  Will check TDM before next 10 mg/kg q 4 week infusion late July and if all well f/u at 3 months and when better f/u in 6 month intervals.  Repeat EGD in 6-12 mos and colon in 12 months.  If worsening, he will call and will work  Also,notify us if any adverse reaction to inflectra.

## 2022-06-17 ENCOUNTER — OFFICE VISIT (OUTPATIENT)
Dept: URBAN - METROPOLITAN AREA CLINIC 117 | Facility: CLINIC | Age: 57
End: 2022-06-17
Payer: COMMERCIAL

## 2022-06-17 VITALS
BODY MASS INDEX: 25.61 KG/M2 | WEIGHT: 169 LBS | SYSTOLIC BLOOD PRESSURE: 147 MMHG | DIASTOLIC BLOOD PRESSURE: 86 MMHG | HEART RATE: 85 BPM | RESPIRATION RATE: 20 BRPM | TEMPERATURE: 98.7 F | HEIGHT: 68 IN

## 2022-06-17 DIAGNOSIS — K50.80 CROHN'S COLITIS: ICD-10-CM

## 2022-06-17 PROCEDURE — 96413 CHEMO IV INFUSION 1 HR: CPT | Performed by: INTERNAL MEDICINE

## 2022-06-17 PROCEDURE — 96415 CHEMO IV INFUSION ADDL HR: CPT | Performed by: INTERNAL MEDICINE

## 2022-06-17 RX ORDER — DEXLANSOPRAZOLE 60 MG/1
1 CAPSULE CAPSULE, DELAYED RELEASE ORAL ONCE A DAY
Qty: 90 CAPSULES | Refills: 3 | Status: ACTIVE | COMMUNITY
Start: 2021-08-23

## 2022-06-17 RX ORDER — AMLODIPINE BESYLATE 10 MG/1
TAKE 1 TABLET (10 MG) BY ORAL ROUTE ONCE DAILY TABLET ORAL 1
Qty: 0 | Refills: 0 | Status: ACTIVE | COMMUNITY
Start: 1900-01-01

## 2022-06-17 RX ORDER — CHOLECALCIFEROL (VITAMIN D3) 50 MCG
1 TABLET TABLET ORAL ONCE A DAY
Status: ACTIVE | COMMUNITY

## 2022-06-17 RX ORDER — PREDNISONE 10 MG/1
TAKE 1 TABLET (10 MG) BY ORAL ROUTE 4 TIMES PER DAY TABLET ORAL
Qty: 120 | Refills: 0 | Status: ACTIVE | COMMUNITY
Start: 1900-01-01

## 2022-06-17 RX ORDER — DICYCLOMINE HYDROCHLORIDE 10 MG/1
TAKE 1 CAPSULE BY ORAL ROUTE 3 TIMES A DAY AS NEEDED CAPSULE ORAL
Qty: 90 | Refills: 5 | Status: ACTIVE | COMMUNITY
Start: 2020-01-06

## 2022-06-17 RX ORDER — AZATHIOPRINE 50 MG/1
TAKE 50 MG TABLET ORAL ONCE DAILY
Qty: 90 TABLETS | Refills: 3 | Status: ACTIVE | COMMUNITY
Start: 2020-10-19 | End: 2023-01-14

## 2022-06-20 LAB
A/G RATIO: 1.3
ALBUMIN: 4.7
ALKALINE PHOSPHATASE: 101
ALT (SGPT): 11
ANTI-INFLIXIMAB ANTIBODY: 23
AST (SGOT): 9
BASO (ABSOLUTE): 0
BASOS: 1
BILIRUBIN, TOTAL: 0.3
BUN/CREATININE RATIO: 18
BUN: 19
C-REACTIVE PROTEIN, QUANT: 6
CALCIUM: 9.4
CARBON DIOXIDE, TOTAL: 17
CHLORIDE: 103
CREATININE: 1.03
EGFR: 84
EOS (ABSOLUTE): 0
EOS: 1
FERRITIN, SERUM: 68
FOLATE (FOLIC ACID), SERUM: 8.4
GLOBULIN, TOTAL: 3.6
GLUCOSE: 83
HBSAG SCREEN: NEGATIVE
HEMATOCRIT: 49.7
HEMATOLOGY COMMENTS:: (no result)
HEMOGLOBIN: 15.1
HEP B SURFACE AB, QUAL: NON REACTIVE
IMMATURE CELLS: (no result)
IMMATURE GRANS (ABS): 0
IMMATURE GRANULOCYTES: 0
INFLIXIMAB DRUG LEVEL: 3.7
IRON BIND.CAP.(TIBC): 339
IRON SATURATION: 12
IRON: 39
LYMPHS (ABSOLUTE): 1
LYMPHS: 16
MCH: 24.6
MCHC: 30.4
MCV: 81
MONOCYTES(ABSOLUTE): 0.5
MONOCYTES: 9
NEUTROPHILS (ABSOLUTE): 4.5
NEUTROPHILS: 73
NRBC: (no result)
PLATELETS: 252
POTASSIUM: 4.1
PROTEIN, TOTAL: 8.3
QUANTIFERON CRITERIA: (no result)
QUANTIFERON INCUBATION: (no result)
QUANTIFERON MITOGEN VALUE: >10
QUANTIFERON NIL VALUE: 0.01
QUANTIFERON TB1 AG VALUE: 0.01
QUANTIFERON TB2 AG VALUE: 0.02
QUANTIFERON-TB GOLD PLUS: NEGATIVE
RBC: 6.14
RDW: 17.2
SEDIMENTATION RATE-WESTERGREN: 61
SODIUM: 136
UIBC: 300
VITAMIN B12: 391
VITAMIN D, 25-HYDROXY: 52.8
WBC: 6.1

## 2022-06-21 LAB
CALPROTECTIN, FECAL: 1000
LACTOFERRIN, FECAL, QUANT.: 29.35

## 2022-07-06 ENCOUNTER — OFFICE VISIT (OUTPATIENT)
Dept: URBAN - METROPOLITAN AREA CLINIC 98 | Facility: CLINIC | Age: 57
End: 2022-07-06
Payer: COMMERCIAL

## 2022-07-06 ENCOUNTER — WEB ENCOUNTER (OUTPATIENT)
Dept: URBAN - METROPOLITAN AREA CLINIC 98 | Facility: CLINIC | Age: 57
End: 2022-07-06

## 2022-07-06 ENCOUNTER — DASHBOARD ENCOUNTERS (OUTPATIENT)
Age: 57
End: 2022-07-06

## 2022-07-06 ENCOUNTER — TELEPHONE ENCOUNTER (OUTPATIENT)
Dept: URBAN - METROPOLITAN AREA CLINIC 98 | Facility: CLINIC | Age: 57
End: 2022-07-06

## 2022-07-06 VITALS
TEMPERATURE: 97.9 F | SYSTOLIC BLOOD PRESSURE: 129 MMHG | BODY MASS INDEX: 25.22 KG/M2 | HEART RATE: 84 BPM | DIASTOLIC BLOOD PRESSURE: 87 MMHG | HEIGHT: 68 IN | WEIGHT: 166.4 LBS

## 2022-07-06 DIAGNOSIS — K76.0 FATTY LIVER: ICD-10-CM

## 2022-07-06 DIAGNOSIS — K50.10 CROHN'S DISEASE OF COLON WITHOUT COMPLICATION: ICD-10-CM

## 2022-07-06 DIAGNOSIS — R19.7 DIARRHEA: ICD-10-CM

## 2022-07-06 DIAGNOSIS — K21.9 GASTROESOPHAGEAL REFLUX DISEASE, UNSPECIFIED WHETHER ESOPHAGITIS PRESENT: ICD-10-CM

## 2022-07-06 PROCEDURE — 99215 OFFICE O/P EST HI 40 MIN: CPT | Performed by: INTERNAL MEDICINE

## 2022-07-06 RX ORDER — DICYCLOMINE HYDROCHLORIDE 10 MG/1
TAKE 1 CAPSULE BY ORAL ROUTE 3 TIMES A DAY AS NEEDED CAPSULE ORAL
Qty: 90 | Refills: 5 | Status: ACTIVE | COMMUNITY
Start: 2020-01-06

## 2022-07-06 RX ORDER — DEXLANSOPRAZOLE 60 MG/1
1 CAPSULE CAPSULE, DELAYED RELEASE ORAL ONCE A DAY
Qty: 90 CAPSULES | Refills: 3 | Status: ACTIVE | COMMUNITY
Start: 2021-08-23

## 2022-07-06 RX ORDER — INFLIXIMAB 100 MG/10ML
AS DIRECTED INJECTION, POWDER, LYOPHILIZED, FOR SOLUTION INTRAVENOUS
Qty: 100 MILLIGRAMS | Refills: 0 | OUTPATIENT
Start: 2022-07-06 | End: 2022-08-05

## 2022-07-06 RX ORDER — PREDNISONE 10 MG/1
TAKE 1 TABLET (10 MG) BY ORAL ROUTE 4 TIMES PER DAY TABLET ORAL
Qty: 120 | Refills: 0 | Status: ACTIVE | COMMUNITY
Start: 1900-01-01

## 2022-07-06 RX ORDER — AMLODIPINE BESYLATE 10 MG/1
TAKE 1 TABLET (10 MG) BY ORAL ROUTE ONCE DAILY TABLET ORAL 1
Qty: 0 | Refills: 0 | Status: ACTIVE | COMMUNITY
Start: 1900-01-01

## 2022-07-06 RX ORDER — CHOLECALCIFEROL (VITAMIN D3) 50 MCG
1 TABLET TABLET ORAL ONCE A DAY
Status: ACTIVE | COMMUNITY

## 2022-07-06 RX ORDER — AZATHIOPRINE 100 MG/1
TAKE ONE TABLET TABLET ORAL
Qty: 90 TABLETS | Refills: 3 | OUTPATIENT
Start: 2022-07-06 | End: 2023-07-01

## 2022-07-06 RX ORDER — AZATHIOPRINE 50 MG/1
TAKE 50 MG TABLET ORAL ONCE DAILY
Qty: 90 TABLETS | Refills: 3 | Status: ACTIVE | COMMUNITY
Start: 2020-10-19 | End: 2023-01-14

## 2022-07-06 NOTE — HPI-TODAY'S VISIT:
[facility]  LOMN: Cliff Price ([birth year]) [facility]  Cliff Price is a 58 yo male with severe Crohn's disease.  He was switched from Remicade to Inflectra 1 year ago.  On Inflectra, he does not feel his Crohn's is controlled as well re: abdominal pain and diarrhea.  His last lab tests showed elevation of the calprotectin, lactoferrin and ESR.  This active disease correlates with a low infliximab level of 3.5; past values were 21 and 7.5.  Target range is 10-20j.  The inflectra is not producing high enough blood levels in this case.  Remicade may do better.  I advise a switch back to Remicade brand.  The infusions should be continued at 10 mg/kg q 4 weeks.  I will continue to carefully monitor him.  Please contact me on my cell phone ([phone]) if you have any questions.  Sincerely, Carmelo Thapa M.D.   -- Addendum    LOMN August 17, 2022  [facility][birth year][facility]  Cliff Price is a 58 yo male with severe Crohn's disease.  He was switched from Remicade to Inflectra 1 year ago.  On Inflectra, he does not feel his Crohn's is controlled as well re: abdominal pain and diarrhea.  His last lab tests showed elevation of the calprotectin, lactoferrin and ESR.  This active disease correlates with a low infliximab level of 3.5; past values were 21 and 7.5.  Target range is 10-20j.  The inflectra is not producing high enough blood levels in this case.  Remicade may do better.  I advise a switch back to Remicade brand.  The infusions should be continued at 10 mg/kg q 4 weeks.  I will continue to carefully monitor him.  Please contact me on my cell phone ([phone]) if you have any questions.  Sincerely, Carmelo Thapa M.D.    58 yo male on inflectra 10 mg/kg q 4 weeks. Aza ---- got reduced from 100 to 50 ---intentional vs clerical He is asyx. Last seen 6/14 Early return due to labs.  Been on inflectra for a year. labs from recent 6/14 visit show: nl crp but------ ESR 61 Calpro of 1000 lacto 29 Infx level 3.7 and low titer antibodies.  Due to increase of ESR and Calpro, will increase aza back to 100 mg a day and stress importance of q 4 week dosing with inflectra 10 mg/kg  Lots of intentional weight loss -----200 1/1/22 to 160 lb now.  ============================= 6/14/22  58 yo male with CD comes in for 1 year f/u. Doing well on inflectra, which he says does not work quite as well as Remicade. Modified diet in January and that made great improvement. Secret is to get rid of the sauce, eliminating CHO, starches, sugars, avoid processed foods. Lots of brocolli, asparagus, ruminant meats,  Last Remicade infusion-- 10 mg/kg BW q 4 w Remicade. Has one more authorized infusion.  H/o esophageal erosions, he did not tolerate omep and dexilant was not covered by insurance, but his diet has alleviated esoph syx too.  The change of diet helped him so he can job and do other exercise. In May, his walking and jogging totaled 70 miles. =============== 7/20/21  57 yo male comes in for f/u.  Last seen 6/9 and do to stable but active CD, was switched to Inflectra.  6/11/21 trough IFX = 5.8 by [facility] with no ATI. Had first infusion on 7/2 and tolerated it fine. Will continue inflectra and watch course.  Had EGD on 7/1 and this showed severe Grade 4 esophagitis. Never had EGD in past. Does not report much past heartburn. Will rx with longterm PPI omeprazole 40 mg bid and then once better, q day.  Will check TDM before next 10 mg/kg q 4 week infusion late July and if all well f/u at 3 months and when better f/u in 6 month intervals.  Repeat EGD in 6-12 mos and colon in 12 months.  If worsening, he will call and will work  Also,notify us if any adverse reaction to inflectra.

## 2022-07-13 LAB
6-MMPN: 386
6-TGN: 115
A/G RATIO: 1.3
ALBUMIN: 4.5
ALKALINE PHOSPHATASE: 112
ALT (SGPT): 13
ANTI-INFLIXIMAB ANTIBODY: <22
AST (SGOT): 12
BASO (ABSOLUTE): 0
BASOS: 1
BILIRUBIN, TOTAL: 0.2
BUN/CREATININE RATIO: 19
BUN: 24
C-REACTIVE PROTEIN, QUANT: 8
CALCIUM: 9.7
CARBON DIOXIDE, TOTAL: 23
CHLORIDE: 104
CREATININE: 1.24
EGFR: 67
EOS (ABSOLUTE): 0.1
EOS: 1
GLOBULIN, TOTAL: 3.6
GLUCOSE: 106
HEMATOCRIT: 49.7
HEMATOLOGY COMMENTS:: (no result)
HEMOGLOBIN: 15.6
IMMATURE CELLS: (no result)
IMMATURE GRANS (ABS): 0
IMMATURE GRANULOCYTES: 0
INFLIXIMAB DRUG LEVEL: 21
LYMPHS (ABSOLUTE): 1
LYMPHS: 14
MCH: 25.7
MCHC: 31.4
MCV: 82
MONOCYTES(ABSOLUTE): 0.7
MONOCYTES: 10
NEUTROPHILS (ABSOLUTE): 5.3
NEUTROPHILS: 74
NRBC: (no result)
PLATELETS: 252
POTASSIUM: 4.8
PROTEIN, TOTAL: 8.1
RBC: 6.07
RDW: 15.7
SEDIMENTATION RATE-WESTERGREN: 15
SODIUM: 141
WBC: 7.1

## 2022-07-14 PROBLEM — 71833008 CROHN'S DISEASE OF SMALL AND LARGE INTESTINES: Status: ACTIVE | Noted: 2022-07-14

## 2022-07-21 PROBLEM — 71833008 CROHN'S DISEASE OF SMALL AND LARGE INTESTINES: Status: ACTIVE | Noted: 2022-07-21

## 2022-07-27 PROBLEM — 235595009: Status: ACTIVE | Noted: 2022-06-24

## 2022-07-28 ENCOUNTER — OFFICE VISIT (OUTPATIENT)
Dept: URBAN - METROPOLITAN AREA SURGERY CENTER 18 | Facility: SURGERY CENTER | Age: 57
End: 2022-07-28
Payer: COMMERCIAL

## 2022-07-28 ENCOUNTER — CLAIMS CREATED FROM THE CLAIM WINDOW (OUTPATIENT)
Dept: URBAN - METROPOLITAN AREA CLINIC 4 | Facility: CLINIC | Age: 57
End: 2022-07-28
Payer: COMMERCIAL

## 2022-07-28 DIAGNOSIS — K51.40 INFLAMMATORY POLYPS OF COLON WITHOUT COMPLICATIONS: ICD-10-CM

## 2022-07-28 DIAGNOSIS — K21.9 GASTRO-ESOPHAGEAL REFLUX DISEASE WITHOUT ESOPHAGITIS: ICD-10-CM

## 2022-07-28 DIAGNOSIS — K31.89 OTHER DISEASES OF STOMACH AND DUODENUM: ICD-10-CM

## 2022-07-28 DIAGNOSIS — K51.40 INFLAMMATORY POLYP OF COLON: ICD-10-CM

## 2022-07-28 DIAGNOSIS — K21.9 ACID REFLUX: ICD-10-CM

## 2022-07-28 DIAGNOSIS — K51.00 ACUTE ULCERATIVE PANCOLITIS: ICD-10-CM

## 2022-07-28 DIAGNOSIS — K31.89 ACQUIRED DEFORMITY OF DUODENUM: ICD-10-CM

## 2022-07-28 DIAGNOSIS — K63.89 OTHER SPECIFIED DISEASES OF INTESTINE: ICD-10-CM

## 2022-07-28 DIAGNOSIS — K51.80 OTHER ULCERATIVE COLITIS WITHOUT COMPLICATIONS: ICD-10-CM

## 2022-07-28 PROBLEM — 50440006: Status: ACTIVE | Noted: 2021-06-09

## 2022-07-28 PROBLEM — 266433003: Status: ACTIVE | Noted: 2022-07-28

## 2022-07-28 PROBLEM — 64766004 ULCERATIVE COLITIS: Status: ACTIVE | Noted: 2020-06-17

## 2022-07-28 PROCEDURE — 88342 IMHCHEM/IMCYTCHM 1ST ANTB: CPT | Performed by: PATHOLOGY

## 2022-07-28 PROCEDURE — 88312 SPECIAL STAINS GROUP 1: CPT | Performed by: PATHOLOGY

## 2022-07-28 PROCEDURE — 45380 COLONOSCOPY AND BIOPSY: CPT | Performed by: INTERNAL MEDICINE

## 2022-07-28 PROCEDURE — 88341 IMHCHEM/IMCYTCHM EA ADD ANTB: CPT | Performed by: PATHOLOGY

## 2022-07-28 PROCEDURE — G8907 PT DOC NO EVENTS ON DISCHARG: HCPCS | Performed by: INTERNAL MEDICINE

## 2022-07-28 PROCEDURE — 43239 EGD BIOPSY SINGLE/MULTIPLE: CPT | Performed by: INTERNAL MEDICINE

## 2022-07-28 PROCEDURE — 88305 TISSUE EXAM BY PATHOLOGIST: CPT | Performed by: PATHOLOGY

## 2022-07-28 RX ORDER — DEXLANSOPRAZOLE 60 MG/1
1 CAPSULE CAPSULE, DELAYED RELEASE ORAL ONCE A DAY
Qty: 90 CAPSULES | Refills: 3 | Status: ACTIVE | COMMUNITY
Start: 2021-08-23

## 2022-07-28 RX ORDER — AMLODIPINE BESYLATE 10 MG/1
TAKE 1 TABLET (10 MG) BY ORAL ROUTE ONCE DAILY TABLET ORAL 1
Qty: 0 | Refills: 0 | Status: ACTIVE | COMMUNITY
Start: 1900-01-01

## 2022-07-28 RX ORDER — INFLIXIMAB 100 MG/10ML
AS DIRECTED INJECTION, POWDER, LYOPHILIZED, FOR SOLUTION INTRAVENOUS
Qty: 100 MILLIGRAMS | Refills: 0 | Status: ACTIVE | COMMUNITY
Start: 2022-07-06 | End: 2022-08-05

## 2022-07-28 RX ORDER — DICYCLOMINE HYDROCHLORIDE 10 MG/1
TAKE 1 CAPSULE BY ORAL ROUTE 3 TIMES A DAY AS NEEDED CAPSULE ORAL
Qty: 90 | Refills: 5 | Status: ACTIVE | COMMUNITY
Start: 2020-01-06

## 2022-07-28 RX ORDER — CHOLECALCIFEROL (VITAMIN D3) 50 MCG
1 TABLET TABLET ORAL ONCE A DAY
Status: ACTIVE | COMMUNITY

## 2022-07-28 RX ORDER — AZATHIOPRINE 50 MG/1
TAKE 50 MG TABLET ORAL ONCE DAILY
Qty: 90 TABLETS | Refills: 3 | Status: ACTIVE | COMMUNITY
Start: 2020-10-19 | End: 2023-01-14

## 2022-07-28 RX ORDER — PREDNISONE 10 MG/1
TAKE 1 TABLET (10 MG) BY ORAL ROUTE 4 TIMES PER DAY TABLET ORAL
Qty: 120 | Refills: 0 | Status: ACTIVE | COMMUNITY
Start: 1900-01-01

## 2022-07-28 RX ORDER — AZATHIOPRINE 100 MG/1
TAKE ONE TABLET TABLET ORAL
Qty: 90 TABLETS | Refills: 3 | Status: ACTIVE | COMMUNITY
Start: 2022-07-06 | End: 2023-07-01

## 2022-08-01 ENCOUNTER — LAB OUTSIDE AN ENCOUNTER (OUTPATIENT)
Dept: URBAN - METROPOLITAN AREA CLINIC 98 | Facility: CLINIC | Age: 57
End: 2022-08-01

## 2022-08-02 ENCOUNTER — OFFICE VISIT (OUTPATIENT)
Dept: URBAN - METROPOLITAN AREA CLINIC 97 | Facility: CLINIC | Age: 57
End: 2022-08-02
Payer: COMMERCIAL

## 2022-08-02 VITALS
DIASTOLIC BLOOD PRESSURE: 84 MMHG | SYSTOLIC BLOOD PRESSURE: 148 MMHG | TEMPERATURE: 98.3 F | BODY MASS INDEX: 24.86 KG/M2 | HEIGHT: 68 IN | WEIGHT: 164 LBS | RESPIRATION RATE: 20 BRPM | HEART RATE: 73 BPM

## 2022-08-02 DIAGNOSIS — K50.80 CROHN'S COLITIS: ICD-10-CM

## 2022-08-02 PROCEDURE — 96415 CHEMO IV INFUSION ADDL HR: CPT | Performed by: INTERNAL MEDICINE

## 2022-08-02 PROCEDURE — 96413 CHEMO IV INFUSION 1 HR: CPT | Performed by: INTERNAL MEDICINE

## 2022-08-02 RX ORDER — AZATHIOPRINE 50 MG/1
TAKE 50 MG TABLET ORAL ONCE DAILY
Qty: 90 TABLETS | Refills: 3 | Status: ACTIVE | COMMUNITY
Start: 2020-10-19 | End: 2023-01-14

## 2022-08-02 RX ORDER — AMLODIPINE BESYLATE 10 MG/1
TAKE 1 TABLET (10 MG) BY ORAL ROUTE ONCE DAILY TABLET ORAL 1
Qty: 0 | Refills: 0 | Status: ACTIVE | COMMUNITY
Start: 1900-01-01

## 2022-08-02 RX ORDER — CHOLECALCIFEROL (VITAMIN D3) 50 MCG
1 TABLET TABLET ORAL ONCE A DAY
Status: ACTIVE | COMMUNITY

## 2022-08-02 RX ORDER — DEXLANSOPRAZOLE 60 MG/1
1 CAPSULE CAPSULE, DELAYED RELEASE ORAL ONCE A DAY
Qty: 90 CAPSULES | Refills: 3 | Status: ACTIVE | COMMUNITY
Start: 2021-08-23

## 2022-08-02 RX ORDER — PREDNISONE 10 MG/1
TAKE 1 TABLET (10 MG) BY ORAL ROUTE 4 TIMES PER DAY TABLET ORAL
Qty: 120 | Refills: 0 | Status: ACTIVE | COMMUNITY
Start: 1900-01-01

## 2022-08-02 RX ORDER — AZATHIOPRINE 100 MG/1
TAKE ONE TABLET TABLET ORAL
Qty: 90 TABLETS | Refills: 3 | Status: ACTIVE | COMMUNITY
Start: 2022-07-06 | End: 2023-07-01

## 2022-08-02 RX ORDER — DICYCLOMINE HYDROCHLORIDE 10 MG/1
TAKE 1 CAPSULE BY ORAL ROUTE 3 TIMES A DAY AS NEEDED CAPSULE ORAL
Qty: 90 | Refills: 5 | Status: ACTIVE | COMMUNITY
Start: 2020-01-06

## 2022-08-02 RX ORDER — INFLIXIMAB 100 MG/10ML
AS DIRECTED INJECTION, POWDER, LYOPHILIZED, FOR SOLUTION INTRAVENOUS
Qty: 100 MILLIGRAMS | Refills: 0 | Status: ACTIVE | COMMUNITY
Start: 2022-07-06 | End: 2022-08-05

## 2022-08-04 LAB
CALPROTECTIN, FECAL: 583
LACTOFERRIN, FECAL, QUANT.: 388.93

## 2022-08-05 ENCOUNTER — TELEPHONE ENCOUNTER (OUTPATIENT)
Dept: URBAN - METROPOLITAN AREA CLINIC 98 | Facility: CLINIC | Age: 57
End: 2022-08-05

## 2022-08-05 RX ORDER — DEXLANSOPRAZOLE 60 MG/1
1 CAPSULE CAPSULE, DELAYED RELEASE ORAL ONCE A DAY
Qty: 90 CAPSULE | Refills: 3 | OUTPATIENT
Start: 2022-08-05

## 2022-08-08 ENCOUNTER — TELEPHONE ENCOUNTER (OUTPATIENT)
Dept: URBAN - METROPOLITAN AREA CLINIC 98 | Facility: CLINIC | Age: 57
End: 2022-08-08

## 2022-08-08 LAB
A/G RATIO: 1.5
ALBUMIN: 4.9
ALKALINE PHOSPHATASE: 127
ALT (SGPT): 13
ANTI-INFLIXIMAB ANTIBODY: <22
AST (SGOT): 11
BASO (ABSOLUTE): 0
BASOS: 1
BILIRUBIN, TOTAL: 0.3
BUN/CREATININE RATIO: 22
BUN: 25
C-REACTIVE PROTEIN, QUANT: 10
CALCIUM: 9.8
CARBON DIOXIDE, TOTAL: 21
CHLORIDE: 102
CREATININE: 1.12
EGFR: 76
EOS (ABSOLUTE): 0.2
EOS: 3
GLOBULIN, TOTAL: 3.2
GLUCOSE: 113
HEMATOCRIT: 50.7
HEMATOLOGY COMMENTS:: (no result)
HEMOGLOBIN: 16.1
IMMATURE CELLS: (no result)
IMMATURE GRANS (ABS): 0
IMMATURE GRANULOCYTES: 0
INFLIXIMAB DRUG LEVEL: 3.5
LYMPHS (ABSOLUTE): 0.8
LYMPHS: 14
MCH: 26.4
MCHC: 31.8
MCV: 83
MONOCYTES(ABSOLUTE): 0.5
MONOCYTES: 9
NEUTROPHILS (ABSOLUTE): 4.4
NEUTROPHILS: 73
NRBC: (no result)
PLATELETS: 268
POTASSIUM: 4.4
PROTEIN, TOTAL: 8.1
RBC: 6.1
RDW: 14.6
SEDIMENTATION RATE-WESTERGREN: 43
SODIUM: 140
WBC: 5.9

## 2022-08-08 RX ORDER — DEXLANSOPRAZOLE 60 MG/1
1 CAPSULE CAPSULE, DELAYED RELEASE ORAL ONCE A DAY
Qty: 90 CAPSULES | Refills: 3
Start: 2022-08-05

## 2022-09-01 ENCOUNTER — OFFICE VISIT (OUTPATIENT)
Dept: URBAN - METROPOLITAN AREA CLINIC 117 | Facility: CLINIC | Age: 57
End: 2022-09-01
Payer: COMMERCIAL

## 2022-09-01 VITALS
SYSTOLIC BLOOD PRESSURE: 139 MMHG | DIASTOLIC BLOOD PRESSURE: 88 MMHG | BODY MASS INDEX: 24.71 KG/M2 | HEART RATE: 74 BPM | RESPIRATION RATE: 20 BRPM | TEMPERATURE: 98.8 F | WEIGHT: 163 LBS | HEIGHT: 68 IN

## 2022-09-01 DIAGNOSIS — K50.80 CROHN'S COLITIS: ICD-10-CM

## 2022-09-01 PROCEDURE — 96415 CHEMO IV INFUSION ADDL HR: CPT | Performed by: INTERNAL MEDICINE

## 2022-09-01 PROCEDURE — 96413 CHEMO IV INFUSION 1 HR: CPT | Performed by: INTERNAL MEDICINE

## 2022-09-01 RX ORDER — AZATHIOPRINE 50 MG/1
TAKE 50 MG TABLET ORAL ONCE DAILY
Qty: 90 TABLETS | Refills: 3 | Status: ACTIVE | COMMUNITY
Start: 2020-10-19 | End: 2023-01-14

## 2022-09-01 RX ORDER — DICYCLOMINE HYDROCHLORIDE 10 MG/1
TAKE 1 CAPSULE BY ORAL ROUTE 3 TIMES A DAY AS NEEDED CAPSULE ORAL
Qty: 90 | Refills: 5 | Status: ACTIVE | COMMUNITY
Start: 2020-01-06

## 2022-09-01 RX ORDER — DEXLANSOPRAZOLE 60 MG/1
1 CAPSULE CAPSULE, DELAYED RELEASE ORAL ONCE A DAY
Qty: 90 CAPSULES | Refills: 3 | Status: ACTIVE | COMMUNITY
Start: 2021-08-23

## 2022-09-01 RX ORDER — PREDNISONE 10 MG/1
TAKE 1 TABLET (10 MG) BY ORAL ROUTE 4 TIMES PER DAY TABLET ORAL
Qty: 120 | Refills: 0 | Status: ACTIVE | COMMUNITY
Start: 1900-01-01

## 2022-09-01 RX ORDER — AMLODIPINE BESYLATE 10 MG/1
TAKE 1 TABLET (10 MG) BY ORAL ROUTE ONCE DAILY TABLET ORAL 1
Qty: 0 | Refills: 0 | Status: ACTIVE | COMMUNITY
Start: 1900-01-01

## 2022-09-01 RX ORDER — CHOLECALCIFEROL (VITAMIN D3) 50 MCG
1 TABLET TABLET ORAL ONCE A DAY
Status: ACTIVE | COMMUNITY

## 2022-09-01 RX ORDER — AZATHIOPRINE 100 MG/1
TAKE ONE TABLET TABLET ORAL
Qty: 90 TABLETS | Refills: 3 | Status: ACTIVE | COMMUNITY
Start: 2022-07-06 | End: 2023-07-01

## 2022-09-01 RX ORDER — DEXLANSOPRAZOLE 60 MG/1
1 CAPSULE CAPSULE, DELAYED RELEASE ORAL ONCE A DAY
Qty: 90 CAPSULES | Refills: 3 | Status: ACTIVE | COMMUNITY
Start: 2022-08-05

## 2022-10-05 ENCOUNTER — OFFICE VISIT (OUTPATIENT)
Dept: URBAN - METROPOLITAN AREA CLINIC 117 | Facility: CLINIC | Age: 57
End: 2022-10-05
Payer: COMMERCIAL

## 2022-10-05 VITALS
HEART RATE: 70 BPM | TEMPERATURE: 97.3 F | WEIGHT: 161 LBS | RESPIRATION RATE: 18 BRPM | BODY MASS INDEX: 24.4 KG/M2 | SYSTOLIC BLOOD PRESSURE: 139 MMHG | HEIGHT: 68 IN | DIASTOLIC BLOOD PRESSURE: 89 MMHG

## 2022-10-05 DIAGNOSIS — K50.80 CROHN'S COLITIS: ICD-10-CM

## 2022-10-05 PROCEDURE — 96413 CHEMO IV INFUSION 1 HR: CPT | Performed by: INTERNAL MEDICINE

## 2022-10-05 PROCEDURE — 96415 CHEMO IV INFUSION ADDL HR: CPT | Performed by: INTERNAL MEDICINE

## 2022-10-05 RX ORDER — DEXLANSOPRAZOLE 60 MG/1
1 CAPSULE CAPSULE, DELAYED RELEASE ORAL ONCE A DAY
Qty: 90 CAPSULES | Refills: 3 | Status: ACTIVE | COMMUNITY
Start: 2021-08-23

## 2022-10-05 RX ORDER — AMLODIPINE BESYLATE 10 MG/1
TAKE 1 TABLET (10 MG) BY ORAL ROUTE ONCE DAILY TABLET ORAL 1
Qty: 0 | Refills: 0 | Status: ACTIVE | COMMUNITY
Start: 1900-01-01

## 2022-10-05 RX ORDER — DICYCLOMINE HYDROCHLORIDE 10 MG/1
TAKE 1 CAPSULE BY ORAL ROUTE 3 TIMES A DAY AS NEEDED CAPSULE ORAL
Qty: 90 | Refills: 5 | Status: ACTIVE | COMMUNITY
Start: 2020-01-06

## 2022-10-05 RX ORDER — AZATHIOPRINE 50 MG/1
TAKE 50 MG TABLET ORAL ONCE DAILY
Qty: 90 TABLETS | Refills: 3 | Status: ACTIVE | COMMUNITY
Start: 2020-10-19 | End: 2023-01-14

## 2022-10-05 RX ORDER — CHOLECALCIFEROL (VITAMIN D3) 50 MCG
1 TABLET TABLET ORAL ONCE A DAY
Status: ACTIVE | COMMUNITY

## 2022-10-05 RX ORDER — DEXLANSOPRAZOLE 60 MG/1
1 CAPSULE CAPSULE, DELAYED RELEASE ORAL ONCE A DAY
Qty: 90 CAPSULES | Refills: 3 | Status: ACTIVE | COMMUNITY
Start: 2022-08-05

## 2022-10-05 RX ORDER — AZATHIOPRINE 100 MG/1
TAKE ONE TABLET TABLET ORAL
Qty: 90 TABLETS | Refills: 3 | Status: ACTIVE | COMMUNITY
Start: 2022-07-06 | End: 2023-07-01

## 2022-10-05 RX ORDER — PREDNISONE 10 MG/1
TAKE 1 TABLET (10 MG) BY ORAL ROUTE 4 TIMES PER DAY TABLET ORAL
Qty: 120 | Refills: 0 | Status: ACTIVE | COMMUNITY
Start: 1900-01-01

## 2022-11-04 ENCOUNTER — OFFICE VISIT (OUTPATIENT)
Dept: URBAN - METROPOLITAN AREA CLINIC 117 | Facility: CLINIC | Age: 57
End: 2022-11-04
Payer: COMMERCIAL

## 2022-11-04 VITALS
HEIGHT: 68 IN | TEMPERATURE: 97.2 F | WEIGHT: 163 LBS | HEART RATE: 65 BPM | SYSTOLIC BLOOD PRESSURE: 148 MMHG | RESPIRATION RATE: 20 BRPM | DIASTOLIC BLOOD PRESSURE: 91 MMHG | BODY MASS INDEX: 24.71 KG/M2

## 2022-11-04 DIAGNOSIS — K50.80 CROHN'S COLITIS: ICD-10-CM

## 2022-11-04 PROCEDURE — 96415 CHEMO IV INFUSION ADDL HR: CPT | Performed by: INTERNAL MEDICINE

## 2022-11-04 PROCEDURE — 96413 CHEMO IV INFUSION 1 HR: CPT | Performed by: INTERNAL MEDICINE

## 2022-11-04 RX ORDER — CHOLECALCIFEROL (VITAMIN D3) 50 MCG
1 TABLET TABLET ORAL ONCE A DAY
Status: ACTIVE | COMMUNITY

## 2022-11-04 RX ORDER — AZATHIOPRINE 50 MG/1
TAKE 50 MG TABLET ORAL ONCE DAILY
Qty: 90 TABLETS | Refills: 3 | Status: ACTIVE | COMMUNITY
Start: 2020-10-19 | End: 2023-01-14

## 2022-11-04 RX ORDER — AZATHIOPRINE 100 MG/1
TAKE ONE TABLET TABLET ORAL
Qty: 90 TABLETS | Refills: 3 | Status: ACTIVE | COMMUNITY
Start: 2022-07-06 | End: 2023-07-01

## 2022-11-04 RX ORDER — DEXLANSOPRAZOLE 60 MG/1
1 CAPSULE CAPSULE, DELAYED RELEASE ORAL ONCE A DAY
Qty: 90 CAPSULES | Refills: 3 | Status: ACTIVE | COMMUNITY
Start: 2021-08-23

## 2022-11-04 RX ORDER — PREDNISONE 10 MG/1
TAKE 1 TABLET (10 MG) BY ORAL ROUTE 4 TIMES PER DAY TABLET ORAL
Qty: 120 | Refills: 0 | Status: ACTIVE | COMMUNITY
Start: 1900-01-01

## 2022-11-04 RX ORDER — DICYCLOMINE HYDROCHLORIDE 10 MG/1
TAKE 1 CAPSULE BY ORAL ROUTE 3 TIMES A DAY AS NEEDED CAPSULE ORAL
Qty: 90 | Refills: 5 | Status: ACTIVE | COMMUNITY
Start: 2020-01-06

## 2022-11-04 RX ORDER — AMLODIPINE BESYLATE 10 MG/1
TAKE 1 TABLET (10 MG) BY ORAL ROUTE ONCE DAILY TABLET ORAL 1
Qty: 0 | Refills: 0 | Status: ACTIVE | COMMUNITY
Start: 1900-01-01

## 2022-11-04 RX ORDER — DEXLANSOPRAZOLE 60 MG/1
1 CAPSULE CAPSULE, DELAYED RELEASE ORAL ONCE A DAY
Qty: 90 CAPSULES | Refills: 3 | Status: ACTIVE | COMMUNITY
Start: 2022-08-05

## 2022-12-02 ENCOUNTER — OFFICE VISIT (OUTPATIENT)
Dept: URBAN - METROPOLITAN AREA CLINIC 117 | Facility: CLINIC | Age: 57
End: 2022-12-02
Payer: COMMERCIAL

## 2022-12-02 VITALS
HEIGHT: 68 IN | DIASTOLIC BLOOD PRESSURE: 92 MMHG | BODY MASS INDEX: 24.46 KG/M2 | HEART RATE: 75 BPM | WEIGHT: 161.4 LBS | TEMPERATURE: 97.5 F | RESPIRATION RATE: 20 BRPM | SYSTOLIC BLOOD PRESSURE: 152 MMHG

## 2022-12-02 DIAGNOSIS — K50.80 CROHN'S COLITIS: ICD-10-CM

## 2022-12-02 PROCEDURE — 96413 CHEMO IV INFUSION 1 HR: CPT | Performed by: INTERNAL MEDICINE

## 2022-12-02 PROCEDURE — 96415 CHEMO IV INFUSION ADDL HR: CPT | Performed by: INTERNAL MEDICINE

## 2022-12-02 RX ORDER — AMLODIPINE BESYLATE 10 MG/1
TAKE 1 TABLET (10 MG) BY ORAL ROUTE ONCE DAILY TABLET ORAL 1
Qty: 0 | Refills: 0 | Status: ACTIVE | COMMUNITY
Start: 1900-01-01

## 2022-12-02 RX ORDER — AZATHIOPRINE 50 MG/1
TAKE 50 MG TABLET ORAL ONCE DAILY
Qty: 90 TABLETS | Refills: 3 | Status: ACTIVE | COMMUNITY
Start: 2020-10-19 | End: 2023-01-14

## 2022-12-02 RX ORDER — DEXLANSOPRAZOLE 60 MG/1
1 CAPSULE CAPSULE, DELAYED RELEASE ORAL ONCE A DAY
Qty: 90 CAPSULES | Refills: 3 | Status: ACTIVE | COMMUNITY
Start: 2021-08-23

## 2022-12-02 RX ORDER — CHOLECALCIFEROL (VITAMIN D3) 50 MCG
1 TABLET TABLET ORAL ONCE A DAY
Status: ACTIVE | COMMUNITY

## 2022-12-02 RX ORDER — DICYCLOMINE HYDROCHLORIDE 10 MG/1
TAKE 1 CAPSULE BY ORAL ROUTE 3 TIMES A DAY AS NEEDED CAPSULE ORAL
Qty: 90 | Refills: 5 | Status: ACTIVE | COMMUNITY
Start: 2020-01-06

## 2022-12-02 RX ORDER — AZATHIOPRINE 100 MG/1
TAKE ONE TABLET TABLET ORAL
Qty: 90 TABLETS | Refills: 3 | Status: ACTIVE | COMMUNITY
Start: 2022-07-06 | End: 2023-07-01

## 2022-12-02 RX ORDER — PREDNISONE 10 MG/1
TAKE 1 TABLET (10 MG) BY ORAL ROUTE 4 TIMES PER DAY TABLET ORAL
Qty: 120 | Refills: 0 | Status: ACTIVE | COMMUNITY
Start: 1900-01-01

## 2022-12-02 RX ORDER — DEXLANSOPRAZOLE 60 MG/1
1 CAPSULE CAPSULE, DELAYED RELEASE ORAL ONCE A DAY
Qty: 90 CAPSULES | Refills: 3 | Status: ACTIVE | COMMUNITY
Start: 2022-08-05

## 2022-12-16 ENCOUNTER — TELEPHONE ENCOUNTER (OUTPATIENT)
Dept: URBAN - METROPOLITAN AREA CLINIC 97 | Facility: CLINIC | Age: 57
End: 2022-12-16

## 2023-01-04 ENCOUNTER — OFFICE VISIT (OUTPATIENT)
Dept: URBAN - METROPOLITAN AREA CLINIC 117 | Facility: CLINIC | Age: 58
End: 2023-01-04

## 2023-01-04 ENCOUNTER — TELEPHONE ENCOUNTER (OUTPATIENT)
Dept: URBAN - METROPOLITAN AREA CLINIC 98 | Facility: CLINIC | Age: 58
End: 2023-01-04

## 2023-01-04 PROBLEM — 7620006 CROHN'S DISEASE OF LARGE BOWEL: Status: ACTIVE | Noted: 2021-06-18
